# Patient Record
Sex: FEMALE | Race: WHITE | NOT HISPANIC OR LATINO | Employment: OTHER | URBAN - METROPOLITAN AREA
[De-identification: names, ages, dates, MRNs, and addresses within clinical notes are randomized per-mention and may not be internally consistent; named-entity substitution may affect disease eponyms.]

---

## 2023-03-03 RX ORDER — METOPROLOL SUCCINATE 50 MG/1
50 TABLET, EXTENDED RELEASE ORAL DAILY
COMMUNITY
Start: 2022-08-19 | End: 2023-05-01 | Stop reason: SDUPTHER

## 2023-03-03 RX ORDER — MONTELUKAST SODIUM 10 MG/1
10 TABLET ORAL NIGHTLY
COMMUNITY
Start: 2022-08-19 | End: 2023-05-01 | Stop reason: SDUPTHER

## 2023-03-03 RX ORDER — OMEPRAZOLE 20 MG/1
20 TABLET, DELAYED RELEASE ORAL DAILY
COMMUNITY
Start: 2022-08-19 | End: 2023-04-11 | Stop reason: SDUPTHER

## 2023-03-03 RX ORDER — TRAMADOL HYDROCHLORIDE 50 MG/1
50 TABLET ORAL EVERY 6 HOURS PRN
COMMUNITY
Start: 2022-09-07 | End: 2024-01-16 | Stop reason: ALTCHOICE

## 2023-03-03 RX ORDER — BUSPIRONE HYDROCHLORIDE 15 MG/1
1 TABLET ORAL 2 TIMES DAILY
COMMUNITY
Start: 2022-08-19 | End: 2023-10-10 | Stop reason: SDUPTHER

## 2023-03-03 RX ORDER — POTASSIUM CHLORIDE 1500 MG/1
20 TABLET, EXTENDED RELEASE ORAL DAILY
COMMUNITY
Start: 2022-08-19 | End: 2024-01-16 | Stop reason: ALTCHOICE

## 2023-03-03 RX ORDER — MULTIVITAMIN
1 TABLET ORAL DAILY
COMMUNITY
Start: 2022-08-19 | End: 2024-01-16 | Stop reason: ALTCHOICE

## 2023-03-03 RX ORDER — ALBUTEROL SULFATE 90 UG/1
2 AEROSOL, METERED RESPIRATORY (INHALATION) EVERY 6 HOURS PRN
COMMUNITY
Start: 2022-08-19 | End: 2024-02-13 | Stop reason: SDUPTHER

## 2023-03-03 RX ORDER — LOSARTAN POTASSIUM AND HYDROCHLOROTHIAZIDE 25; 100 MG/1; MG/1
1 TABLET ORAL DAILY
COMMUNITY
Start: 2022-08-19 | End: 2023-04-11 | Stop reason: SDUPTHER

## 2023-03-03 RX ORDER — POLYETHYLENE GLYCOL 3350 17 G/17G
17 POWDER, FOR SOLUTION ORAL
COMMUNITY
Start: 2022-08-19 | End: 2024-01-16 | Stop reason: ALTCHOICE

## 2023-03-03 RX ORDER — TRAZODONE HYDROCHLORIDE 50 MG/1
50 TABLET ORAL NIGHTLY
COMMUNITY
Start: 2022-08-19 | End: 2023-10-10 | Stop reason: SDUPTHER

## 2023-03-03 RX ORDER — DOCUSATE SODIUM 100 MG/1
100 CAPSULE, LIQUID FILLED ORAL 2 TIMES DAILY
COMMUNITY
Start: 2022-08-19 | End: 2024-02-16

## 2023-03-03 RX ORDER — GABAPENTIN 300 MG/1
300 CAPSULE ORAL 3 TIMES DAILY
COMMUNITY
End: 2023-10-10

## 2023-03-03 RX ORDER — FERROUS SULFATE 325(65) MG
65 TABLET ORAL
COMMUNITY
Start: 2022-09-08 | End: 2024-01-16 | Stop reason: ALTCHOICE

## 2023-03-03 RX ORDER — HYDROXYZINE HYDROCHLORIDE 25 MG/1
25 TABLET, FILM COATED ORAL 2 TIMES DAILY
COMMUNITY
Start: 2022-08-19 | End: 2023-10-10

## 2023-03-03 RX ORDER — LANOLIN ALCOHOL/MO/W.PET/CERES
1 CREAM (GRAM) TOPICAL DAILY
COMMUNITY
Start: 2022-08-19

## 2023-03-03 RX ORDER — MV/FA/DHA/EPA/FISH OIL/SAW/GNK 400MCG-200
COMBINATION PACKAGE (EA) ORAL
COMMUNITY
Start: 2022-08-19

## 2023-03-03 RX ORDER — ESCITALOPRAM OXALATE 10 MG/1
10 TABLET ORAL DAILY
COMMUNITY
Start: 2022-08-19 | End: 2023-12-08

## 2023-03-03 RX ORDER — ATORVASTATIN CALCIUM 10 MG/1
10 TABLET, FILM COATED ORAL DAILY
COMMUNITY
Start: 2022-08-19 | End: 2023-05-01 | Stop reason: SDUPTHER

## 2023-03-03 RX ORDER — VIT C/E/ZN/COPPR/LUTEIN/ZEAXAN 250MG-90MG
25 CAPSULE ORAL DAILY
COMMUNITY
Start: 2022-08-19

## 2023-03-03 RX ORDER — FLUTICASONE PROPIONATE 110 UG/1
2 AEROSOL, METERED RESPIRATORY (INHALATION) 2 TIMES DAILY
COMMUNITY
Start: 2022-08-19 | End: 2024-01-04

## 2023-03-24 ENCOUNTER — TELEPHONE (OUTPATIENT)
Dept: PRIMARY CARE | Facility: CLINIC | Age: 78
End: 2023-03-24
Payer: MEDICARE

## 2023-03-24 DIAGNOSIS — F43.0 ACUTE REACTION TO STRESS: ICD-10-CM

## 2023-03-24 RX ORDER — BUPROPION HYDROCHLORIDE 150 MG/1
150 TABLET ORAL DAILY
Qty: 90 TABLET | Refills: 1 | Status: CANCELLED | OUTPATIENT
Start: 2023-03-24

## 2023-03-24 NOTE — TELEPHONE ENCOUNTER
Pt said it is cheaper for her to get her 90 day supplies and wanted to know if you can send her refill to [facility].     REFILL  MEDICATION:     Bupropion HCL ER  MG; Take 1 tablet daily.     PHARM: [facility]    LR: 9-27-22  90 tablets with 1 refill   LV: 1-14-23  NV: 4-11-23

## 2023-03-25 RX ORDER — BUPROPION HYDROCHLORIDE 150 MG/1
150 TABLET ORAL DAILY
Qty: 90 TABLET | Refills: 1 | Status: SHIPPED | OUTPATIENT
Start: 2023-03-25 | End: 2023-09-22 | Stop reason: SDUPTHER

## 2023-04-07 ENCOUNTER — TELEPHONE (OUTPATIENT)
Dept: PRIMARY CARE | Facility: CLINIC | Age: 78
End: 2023-04-07
Payer: MEDICARE

## 2023-04-07 DIAGNOSIS — K21.9 GASTROESOPHAGEAL REFLUX DISEASE WITHOUT ESOPHAGITIS: Primary | ICD-10-CM

## 2023-04-07 DIAGNOSIS — I10 BENIGN ESSENTIAL HYPERTENSION: ICD-10-CM

## 2023-04-07 NOTE — TELEPHONE ENCOUNTER
Pt will be out of her Losartan before she will receive her prescriptions. I sent a prescription refill to a covering for a 30 day supply.     REFILL  MEDICATION:     Omeprazole 20 MG; Take 1 capsule PO every day.    Losartan Potassium hydrochlorothiazide 100-25 MG; Take 1 tablet daily.     PHARM: [facility]    LR: 2-21-23  30 tablets with 0 refills   LV: 1-17-23  NV: 4-11-23

## 2023-04-07 NOTE — TELEPHONE ENCOUNTER
Pt of Dr. Ruba Maher. Dr. Cuellar is covering. Pt will be out of her Losartan before Tuesday and before she receives her prescription from [facility]. Can you send in a 30 day supply to [facility]?     REFILL  MEDICATION:     Losartan Potassium hydrochlorothiazide 110-25 MG; Take 1 tablet daily.     PHARM: [facility]   PHARM NUMBER: [phone]    LR: 2-21-23  30 tablets with 0 refills   LV: 1-17-23  NV: 4-11-23

## 2023-04-11 ENCOUNTER — OFFICE VISIT (OUTPATIENT)
Dept: PRIMARY CARE | Facility: CLINIC | Age: 78
End: 2023-04-11
Payer: MEDICARE

## 2023-04-11 VITALS
SYSTOLIC BLOOD PRESSURE: 138 MMHG | HEIGHT: 68 IN | BODY MASS INDEX: 28.04 KG/M2 | DIASTOLIC BLOOD PRESSURE: 70 MMHG | WEIGHT: 185 LBS | TEMPERATURE: 97.5 F

## 2023-04-11 DIAGNOSIS — Z78.0 ASYMPTOMATIC MENOPAUSAL STATE: Primary | ICD-10-CM

## 2023-04-11 DIAGNOSIS — I10 BENIGN ESSENTIAL HYPERTENSION: ICD-10-CM

## 2023-04-11 DIAGNOSIS — Z00.00 ROUTINE GENERAL MEDICAL EXAMINATION AT HEALTH CARE FACILITY: ICD-10-CM

## 2023-04-11 PROBLEM — Z96.651 HISTORY OF TOTAL RIGHT KNEE REPLACEMENT: Status: ACTIVE | Noted: 2023-04-11

## 2023-04-11 PROBLEM — J45.909 ASTHMA (HHS-HCC): Status: ACTIVE | Noted: 2023-04-11

## 2023-04-11 PROBLEM — Z96.651 PAINFUL TOTAL KNEE REPLACEMENT, RIGHT (CMS-HCC): Status: ACTIVE | Noted: 2023-04-11

## 2023-04-11 PROBLEM — E55.9 VITAMIN D DEFICIENCY: Status: ACTIVE | Noted: 2023-04-11

## 2023-04-11 PROBLEM — Z96.652 STATUS POST LEFT KNEE REPLACEMENT: Status: ACTIVE | Noted: 2023-04-11

## 2023-04-11 PROBLEM — Z96.1 PSEUDOPHAKIA, RIGHT EYE: Status: ACTIVE | Noted: 2023-04-11

## 2023-04-11 PROBLEM — M43.16 SPONDYLOLISTHESIS AT L4-L5 LEVEL: Status: ACTIVE | Noted: 2023-04-11

## 2023-04-11 PROBLEM — K63.5 COLONIC POLYP: Status: ACTIVE | Noted: 2023-04-11

## 2023-04-11 PROBLEM — M47.816 DEGENERATIVE JOINT DISEASE (DJD) OF LUMBAR SPINE: Status: ACTIVE | Noted: 2023-04-11

## 2023-04-11 PROBLEM — D50.9 IRON DEFICIENCY ANEMIA: Status: ACTIVE | Noted: 2023-04-11

## 2023-04-11 PROBLEM — F32.9 MDD (MAJOR DEPRESSIVE DISORDER): Status: ACTIVE | Noted: 2023-04-11

## 2023-04-11 PROBLEM — K21.9 GERD (GASTROESOPHAGEAL REFLUX DISEASE): Status: ACTIVE | Noted: 2023-04-11

## 2023-04-11 PROBLEM — I49.3 PVCS (PREMATURE VENTRICULAR CONTRACTIONS): Status: ACTIVE | Noted: 2023-04-11

## 2023-04-11 PROBLEM — F33.9 MAJOR DEPRESSIVE DISORDER, RECURRENT (CMS-HCC): Status: ACTIVE | Noted: 2023-04-11

## 2023-04-11 PROBLEM — G60.8 POLYNEUROPATHY, PERIPHERAL SENSORIMOTOR AXONAL: Status: ACTIVE | Noted: 2023-04-11

## 2023-04-11 PROBLEM — F41.9 ANXIETY DISORDER: Status: ACTIVE | Noted: 2023-04-11

## 2023-04-11 PROBLEM — M25.50 POLYARTHRALGIA: Status: ACTIVE | Noted: 2023-04-11

## 2023-04-11 PROBLEM — G47.00 INSOMNIA: Status: ACTIVE | Noted: 2023-04-11

## 2023-04-11 PROBLEM — M17.0 PRIMARY OSTEOARTHRITIS OF BOTH KNEES: Status: ACTIVE | Noted: 2023-04-11

## 2023-04-11 PROBLEM — R00.2 PALPITATIONS: Status: ACTIVE | Noted: 2023-04-11

## 2023-04-11 PROBLEM — M81.0 OSTEOPOROSIS, SENILE: Status: ACTIVE | Noted: 2023-04-11

## 2023-04-11 PROBLEM — M19.011 DEGENERATIVE JOINT DISEASE, SHOULDER, RIGHT: Status: ACTIVE | Noted: 2023-04-11

## 2023-04-11 PROBLEM — G89.18 PAIN FOLLOWING ORAL SURGERY: Status: ACTIVE | Noted: 2023-04-11

## 2023-04-11 PROBLEM — Z96.1 PSEUDOPHAKIA OF LEFT EYE: Status: ACTIVE | Noted: 2023-04-11

## 2023-04-11 PROBLEM — R53.83 FATIGUE: Status: ACTIVE | Noted: 2023-04-11

## 2023-04-11 PROBLEM — M96.1 LUMBAR POSTLAMINECTOMY SYNDROME: Status: ACTIVE | Noted: 2023-04-11

## 2023-04-11 PROBLEM — E78.5 HYPERLIPIDEMIA: Status: ACTIVE | Noted: 2023-04-11

## 2023-04-11 PROBLEM — M19.90 OA (OSTEOARTHRITIS): Status: ACTIVE | Noted: 2023-04-11

## 2023-04-11 PROBLEM — M54.9 BACK PAIN: Status: ACTIVE | Noted: 2023-04-11

## 2023-04-11 PROBLEM — M16.9 DEGENERATIVE JOINT DISEASE (DJD) OF HIP: Status: ACTIVE | Noted: 2023-04-11

## 2023-04-11 PROBLEM — T84.84XA PAINFUL TOTAL KNEE REPLACEMENT, RIGHT (CMS-HCC): Status: ACTIVE | Noted: 2023-04-11

## 2023-04-11 PROCEDURE — 1159F MED LIST DOCD IN RCRD: CPT | Performed by: FAMILY MEDICINE

## 2023-04-11 PROCEDURE — 1170F FXNL STATUS ASSESSED: CPT | Performed by: FAMILY MEDICINE

## 2023-04-11 PROCEDURE — G0439 PPPS, SUBSEQ VISIT: HCPCS | Performed by: FAMILY MEDICINE

## 2023-04-11 PROCEDURE — 3075F SYST BP GE 130 - 139MM HG: CPT | Performed by: FAMILY MEDICINE

## 2023-04-11 PROCEDURE — 3078F DIAST BP <80 MM HG: CPT | Performed by: FAMILY MEDICINE

## 2023-04-11 PROCEDURE — 1036F TOBACCO NON-USER: CPT | Performed by: FAMILY MEDICINE

## 2023-04-11 RX ORDER — OMEPRAZOLE 20 MG/1
20 TABLET, DELAYED RELEASE ORAL DAILY
Qty: 30 TABLET | Refills: 0 | Status: SHIPPED | OUTPATIENT
Start: 2023-04-11 | End: 2023-05-01 | Stop reason: SDUPTHER

## 2023-04-11 RX ORDER — LOSARTAN POTASSIUM AND HYDROCHLOROTHIAZIDE 25; 100 MG/1; MG/1
1 TABLET ORAL DAILY
Qty: 90 TABLET | Refills: 3 | Status: SHIPPED | OUTPATIENT
Start: 2023-04-11 | End: 2023-04-11 | Stop reason: SDUPTHER

## 2023-04-11 RX ORDER — OMEPRAZOLE 20 MG/1
20 TABLET, DELAYED RELEASE ORAL DAILY
Qty: 30 TABLET | Refills: 0 | OUTPATIENT
Start: 2023-04-11

## 2023-04-11 RX ORDER — LOSARTAN POTASSIUM AND HYDROCHLOROTHIAZIDE 25; 100 MG/1; MG/1
1 TABLET ORAL DAILY
Qty: 30 TABLET | Refills: 0 | Status: SHIPPED | OUTPATIENT
Start: 2023-04-11 | End: 2023-04-11 | Stop reason: SDUPTHER

## 2023-04-11 RX ORDER — OMEPRAZOLE 20 MG/1
20 TABLET, DELAYED RELEASE ORAL DAILY
Qty: 90 TABLET | Refills: 3 | Status: SHIPPED | OUTPATIENT
Start: 2023-04-11 | End: 2023-04-11 | Stop reason: SDUPTHER

## 2023-04-11 RX ORDER — LOSARTAN POTASSIUM AND HYDROCHLOROTHIAZIDE 25; 100 MG/1; MG/1
1 TABLET ORAL DAILY
Qty: 90 TABLET | Refills: 3 | Status: SHIPPED | OUTPATIENT
Start: 2023-04-11 | End: 2023-10-10 | Stop reason: SDUPTHER

## 2023-04-11 RX ORDER — LOSARTAN POTASSIUM AND HYDROCHLOROTHIAZIDE 25; 100 MG/1; MG/1
1 TABLET ORAL DAILY
Qty: 30 TABLET | Refills: 0 | OUTPATIENT
Start: 2023-04-11

## 2023-04-11 ASSESSMENT — PATIENT HEALTH QUESTIONNAIRE - PHQ9
SUM OF ALL RESPONSES TO PHQ9 QUESTIONS 1 AND 2: 0
2. FEELING DOWN, DEPRESSED OR HOPELESS: NOT AT ALL
1. LITTLE INTEREST OR PLEASURE IN DOING THINGS: NOT AT ALL

## 2023-04-11 NOTE — PROGRESS NOTES
"Subjective   Reason for Visit: Carmen Bauman is an 77 y.o. female here for a Medicare Wellness visit.     Past Medical, Surgical, and Family History reviewed and updated in chart.         HPI    Patient Care Team:  Ruba Maher DO as PCP - General  Ruba Maher DO as PCP - [facility] [place]     Review of Systems    Objective   Vitals:  /70 (BP Location: Left arm, Patient Position: Sitting)   Temp 36.4 °C (97.5 °F)   Ht 1.727 m (5' 8\")   Wt 83.9 kg (185 lb)   BMI 28.13 kg/m²       Physical Exam    Assessment/Plan   Problem List Items Addressed This Visit          Circulatory    Benign essential hypertension     Other Visit Diagnoses       Asymptomatic menopausal state    -  Primary    Relevant Orders    XR DEXA bone density    Routine general medical examination at health care facility              Medications reviewed, Dexa ordered for bone density testing  Follow up 3-6 mo     "

## 2023-04-12 ASSESSMENT — ACTIVITIES OF DAILY LIVING (ADL)
DOING_HOUSEWORK: INDEPENDENT
DRESSING: INDEPENDENT
GROCERY_SHOPPING: INDEPENDENT
MANAGING_FINANCES: INDEPENDENT
BATHING: INDEPENDENT
TAKING_MEDICATION: INDEPENDENT

## 2023-04-12 ASSESSMENT — PATIENT HEALTH QUESTIONNAIRE - PHQ9
2. FEELING DOWN, DEPRESSED OR HOPELESS: NOT AT ALL
1. LITTLE INTEREST OR PLEASURE IN DOING THINGS: NOT AT ALL
SUM OF ALL RESPONSES TO PHQ9 QUESTIONS 1 AND 2: 0

## 2023-05-01 ENCOUNTER — TELEPHONE (OUTPATIENT)
Dept: PRIMARY CARE | Facility: CLINIC | Age: 78
End: 2023-05-01
Payer: MEDICARE

## 2023-05-01 DIAGNOSIS — E78.5 HYPERLIPIDEMIA, UNSPECIFIED HYPERLIPIDEMIA TYPE: Primary | ICD-10-CM

## 2023-05-01 DIAGNOSIS — I10 BENIGN ESSENTIAL HYPERTENSION: ICD-10-CM

## 2023-05-01 DIAGNOSIS — J45.909 ASTHMA, UNSPECIFIED ASTHMA SEVERITY, UNSPECIFIED WHETHER COMPLICATED, UNSPECIFIED WHETHER PERSISTENT (HHS-HCC): ICD-10-CM

## 2023-05-01 DIAGNOSIS — K21.9 GASTROESOPHAGEAL REFLUX DISEASE WITHOUT ESOPHAGITIS: ICD-10-CM

## 2023-05-01 RX ORDER — MONTELUKAST SODIUM 10 MG/1
10 TABLET ORAL NIGHTLY
Qty: 90 TABLET | Refills: 3 | Status: SHIPPED | OUTPATIENT
Start: 2023-05-01 | End: 2024-04-10 | Stop reason: SDUPTHER

## 2023-05-01 RX ORDER — OMEPRAZOLE 20 MG/1
20 TABLET, DELAYED RELEASE ORAL DAILY
Qty: 90 TABLET | Refills: 1 | Status: SHIPPED | OUTPATIENT
Start: 2023-05-01 | End: 2023-10-10 | Stop reason: SDUPTHER

## 2023-05-01 RX ORDER — ATORVASTATIN CALCIUM 10 MG/1
10 TABLET, FILM COATED ORAL DAILY
Qty: 90 TABLET | Refills: 3 | Status: SHIPPED | OUTPATIENT
Start: 2023-05-01 | End: 2023-10-10 | Stop reason: SDUPTHER

## 2023-05-01 RX ORDER — METOPROLOL SUCCINATE 50 MG/1
50 TABLET, EXTENDED RELEASE ORAL DAILY
Qty: 90 TABLET | Refills: 3 | Status: SHIPPED | OUTPATIENT
Start: 2023-05-01 | End: 2024-04-10 | Stop reason: SDUPTHER

## 2023-05-01 NOTE — TELEPHONE ENCOUNTER
REFILL  MEDICATION:     Atorvastatin Calcium 20 MG; Take one tablet po daily.     Metoprolol Succinate ER 50 MG; 1 tab every day at bed time.     Montelukast Sodium 10 MG; Take 1 tablet at bedtime.     Omeprazole 20 MG; Take 1 capsule po every day.     LR: 2-21-23  30 capsules with 0 refills     PHARM: [facility]     LR: 4-28-22  90 tablets with 3 refills for first three medications   LV: 4-11-23  NV: 10-10-23

## 2023-06-06 ENCOUNTER — APPOINTMENT (OUTPATIENT)
Dept: PRIMARY CARE | Facility: CLINIC | Age: 78
End: 2023-06-06
Payer: MEDICARE

## 2023-06-18 LAB — URINE CULTURE: NORMAL

## 2023-06-19 PROBLEM — M19.90 UNSPECIFIED OSTEOARTHRITIS, UNSPECIFIED SITE: Status: ACTIVE | Noted: 2022-08-11

## 2023-06-19 PROBLEM — E55.9 VITAMIN D DEFICIENCY, UNSPECIFIED: Status: ACTIVE | Noted: 2022-08-11

## 2023-06-19 PROBLEM — E78.5 HYPERLIPIDEMIA, UNSPECIFIED: Status: ACTIVE | Noted: 2022-08-11

## 2023-06-19 PROBLEM — I10 ESSENTIAL (PRIMARY) HYPERTENSION: Status: ACTIVE | Noted: 2022-08-11

## 2023-06-19 PROBLEM — F41.9 ANXIETY DISORDER, UNSPECIFIED: Status: ACTIVE | Noted: 2022-08-11

## 2023-06-19 PROBLEM — F32.A DEPRESSION, UNSPECIFIED: Status: ACTIVE | Noted: 2022-08-11

## 2023-06-19 PROBLEM — M47.16 LUMBAR SPONDYLOSIS WITH MYELOPATHY: Status: ACTIVE | Noted: 2019-09-21

## 2023-06-19 PROBLEM — R52 PAIN, UNSPECIFIED: Status: ACTIVE | Noted: 2022-08-11

## 2023-06-19 PROBLEM — R27.9 UNSPECIFIED LACK OF COORDINATION: Status: ACTIVE | Noted: 2022-08-11

## 2023-06-19 PROBLEM — I47.10 SUPRAVENTRICULAR TACHYCARDIA (CMS-HCC): Status: ACTIVE | Noted: 2019-09-24

## 2023-06-19 PROBLEM — K21.9 GASTRO-ESOPHAGEAL REFLUX DISEASE WITHOUT ESOPHAGITIS: Status: ACTIVE | Noted: 2022-08-11

## 2023-06-19 PROBLEM — M62.81 MUSCLE WEAKNESS (GENERALIZED): Status: ACTIVE | Noted: 2022-08-11

## 2023-06-19 PROBLEM — Z96.642 PRESENCE OF LEFT ARTIFICIAL HIP JOINT: Status: ACTIVE | Noted: 2019-02-21

## 2023-06-19 PROBLEM — J45.909 UNSPECIFIED ASTHMA, UNCOMPLICATED (HHS-HCC): Status: ACTIVE | Noted: 2022-08-11

## 2023-06-20 ENCOUNTER — APPOINTMENT (OUTPATIENT)
Dept: PRIMARY CARE | Facility: CLINIC | Age: 78
End: 2023-06-20
Payer: MEDICARE

## 2023-09-11 ENCOUNTER — HOSPITAL ENCOUNTER (OUTPATIENT)
Dept: DATA CONVERSION | Facility: HOSPITAL | Age: 78
End: 2023-09-11
Attending: ANESTHESIOLOGY | Admitting: ANESTHESIOLOGY
Payer: MEDICARE

## 2023-09-11 DIAGNOSIS — M54.16 RADICULOPATHY, LUMBAR REGION: ICD-10-CM

## 2023-09-15 LAB
6-ACETYLMORPHINE: <25 NG/ML
7-AMINOCLONAZEPAM: <25 NG/ML
ALPHA-HYDROXYALPRAZOLAM: <25 NG/ML
ALPHA-HYDROXYMIDAZOLAM: <25 NG/ML
ALPRAZOLAM: <25 NG/ML
AMPHETAMINE (PRESENCE) IN URINE BY SCREEN METHOD: ABNORMAL
BARBITURATES PRESENCE IN URINE BY SCREEN METHOD: ABNORMAL
CANNABINOIDS IN URINE BY SCREEN METHOD: ABNORMAL
CHLORDIAZEPOXIDE: <25 NG/ML
CLONAZEPAM: <25 NG/ML
COCAINE (PRESENCE) IN URINE BY SCREEN METHOD: ABNORMAL
CODEINE: <50 NG/ML
CREATINE, URINE FOR DRUG: 45.3 MG/DL
DIAZEPAM: <25 NG/ML
DRUG SCREEN COMMENT URINE: ABNORMAL
EDDP: <25 NG/ML
FENTANYL CONFIRMATION, URINE: <2.5 NG/ML
HYDROCODONE: <25 NG/ML
HYDROMORPHONE: <25 NG/ML
LORAZEPAM: <25 NG/ML
METHADONE CONFIRMATION,URINE: <25 NG/ML
MIDAZOLAM: <25 NG/ML
MORPHINE URINE: <50 NG/ML
NORDIAZEPAM: <25 NG/ML
NORFENTANYL: <2.5 NG/ML
NORHYDROCODONE: <25 NG/ML
NOROXYCODONE: <25 NG/ML
O-DESMETHYLTRAMADOL: >1000 NG/ML
OXAZEPAM: <25 NG/ML
OXYCODONE: <25 NG/ML
OXYMORPHONE: <25 NG/ML
PHENCYCLIDINE (PRESENCE) IN URINE BY SCREEN METHOD: ABNORMAL
TEMAZEPAM: <25 NG/ML
TRAMADOL: >1000 NG/ML
ZOLPIDEM METABOLITE (ZCA): <25 NG/ML
ZOLPIDEM: <25 NG/ML

## 2023-09-22 ENCOUNTER — TELEPHONE (OUTPATIENT)
Dept: PRIMARY CARE | Facility: CLINIC | Age: 78
End: 2023-09-22
Payer: MEDICARE

## 2023-09-22 DIAGNOSIS — F43.0 ACUTE REACTION TO STRESS: ICD-10-CM

## 2023-09-22 RX ORDER — BUPROPION HYDROCHLORIDE 150 MG/1
150 TABLET ORAL DAILY
Qty: 90 TABLET | Refills: 1 | Status: SHIPPED | OUTPATIENT
Start: 2023-09-22 | End: 2024-04-10 | Stop reason: SDUPTHER

## 2023-09-22 NOTE — TELEPHONE ENCOUNTER
REFILL  MEDICATION:     Bupropion  MG;Take 1 tablet once daily. Do not crush, chew, or split.     PHARM: [facility]     LR: 3-25-23   90 tablets with 1 refill  LV: 4-11-23  NV: 10-10-23

## 2023-09-29 VITALS — WEIGHT: 183.42 LBS | BODY MASS INDEX: 27.8 KG/M2 | HEIGHT: 68 IN

## 2023-10-09 PROBLEM — L82.1 OTHER SEBORRHEIC KERATOSIS: Status: RESOLVED | Noted: 2021-05-27 | Resolved: 2023-10-09

## 2023-10-09 PROBLEM — R06.7 SNEEZING WITH WATERY EYES: Status: ACTIVE | Noted: 2023-10-09

## 2023-10-09 PROBLEM — L81.4 OTHER MELANIN HYPERPIGMENTATION: Status: RESOLVED | Noted: 2021-05-27 | Resolved: 2023-10-09

## 2023-10-09 PROBLEM — D49.2 NEOPLASM OF UNSPECIFIED BEHAVIOR OF BONE, SOFT TISSUE, AND SKIN: Status: ACTIVE | Noted: 2021-05-27

## 2023-10-09 PROBLEM — R21 RASH AND OTHER NONSPECIFIC SKIN ERUPTION: Status: RESOLVED | Noted: 2021-05-27 | Resolved: 2023-10-09

## 2023-10-09 PROBLEM — D22.5 MELANOCYTIC NEVI OF TRUNK: Status: RESOLVED | Noted: 2021-05-27 | Resolved: 2023-10-09

## 2023-10-09 PROBLEM — J30.9 ALLERGIC RHINITIS: Status: ACTIVE | Noted: 2023-10-09

## 2023-10-09 PROBLEM — H04.209 SNEEZING WITH WATERY EYES: Status: ACTIVE | Noted: 2023-10-09

## 2023-10-09 PROBLEM — D18.01 HEMANGIOMA OF SKIN AND SUBCUTANEOUS TISSUE: Status: RESOLVED | Noted: 2021-05-27 | Resolved: 2023-10-09

## 2023-10-10 ENCOUNTER — OFFICE VISIT (OUTPATIENT)
Dept: PRIMARY CARE | Facility: CLINIC | Age: 78
End: 2023-10-10
Payer: MEDICARE

## 2023-10-10 VITALS — WEIGHT: 188 LBS | DIASTOLIC BLOOD PRESSURE: 68 MMHG | BODY MASS INDEX: 28.66 KG/M2 | SYSTOLIC BLOOD PRESSURE: 118 MMHG

## 2023-10-10 DIAGNOSIS — E78.5 HYPERLIPIDEMIA, UNSPECIFIED HYPERLIPIDEMIA TYPE: ICD-10-CM

## 2023-10-10 DIAGNOSIS — K21.9 GASTROESOPHAGEAL REFLUX DISEASE WITHOUT ESOPHAGITIS: ICD-10-CM

## 2023-10-10 DIAGNOSIS — F51.01 PRIMARY INSOMNIA: Primary | ICD-10-CM

## 2023-10-10 DIAGNOSIS — M54.2 NECK PAIN: ICD-10-CM

## 2023-10-10 DIAGNOSIS — I10 BENIGN ESSENTIAL HYPERTENSION: ICD-10-CM

## 2023-10-10 DIAGNOSIS — F41.9 ANXIETY DISORDER, UNSPECIFIED TYPE: ICD-10-CM

## 2023-10-10 PROCEDURE — 3074F SYST BP LT 130 MM HG: CPT | Performed by: FAMILY MEDICINE

## 2023-10-10 PROCEDURE — 3078F DIAST BP <80 MM HG: CPT | Performed by: FAMILY MEDICINE

## 2023-10-10 PROCEDURE — 1036F TOBACCO NON-USER: CPT | Performed by: FAMILY MEDICINE

## 2023-10-10 PROCEDURE — 99214 OFFICE O/P EST MOD 30 MIN: CPT | Performed by: FAMILY MEDICINE

## 2023-10-10 PROCEDURE — 1159F MED LIST DOCD IN RCRD: CPT | Performed by: FAMILY MEDICINE

## 2023-10-10 PROCEDURE — 1125F AMNT PAIN NOTED PAIN PRSNT: CPT | Performed by: FAMILY MEDICINE

## 2023-10-10 RX ORDER — BUSPIRONE HYDROCHLORIDE 15 MG/1
15 TABLET ORAL 2 TIMES DAILY PRN
Qty: 90 TABLET | Refills: 0
Start: 2023-10-10

## 2023-10-10 RX ORDER — MULTIVITAMIN/IRON/FOLIC ACID 18MG-0.4MG
1 TABLET ORAL DAILY
COMMUNITY

## 2023-10-10 RX ORDER — LOSARTAN POTASSIUM AND HYDROCHLOROTHIAZIDE 25; 100 MG/1; MG/1
1 TABLET ORAL DAILY
Qty: 90 TABLET | Refills: 3 | Status: SHIPPED | OUTPATIENT
Start: 2023-10-10 | End: 2024-05-15 | Stop reason: DRUGHIGH

## 2023-10-10 RX ORDER — OMEPRAZOLE 20 MG/1
20 TABLET, DELAYED RELEASE ORAL DAILY
Qty: 90 TABLET | Refills: 1 | Status: SHIPPED | OUTPATIENT
Start: 2023-10-10 | End: 2024-04-10 | Stop reason: SDUPTHER

## 2023-10-10 RX ORDER — TRAZODONE HYDROCHLORIDE 50 MG/1
25 TABLET ORAL NIGHTLY
Qty: 45 TABLET | Refills: 1
Start: 2023-10-10 | End: 2023-11-13 | Stop reason: SDUPTHER

## 2023-10-10 RX ORDER — ATORVASTATIN CALCIUM 10 MG/1
10 TABLET, FILM COATED ORAL DAILY
Qty: 90 TABLET | Refills: 3 | Status: SHIPPED | OUTPATIENT
Start: 2023-10-10 | End: 2024-04-10 | Stop reason: SDUPTHER

## 2023-10-10 NOTE — PROGRESS NOTES
Chief complaint:   Chief Complaint   Patient presents with    Follow-up     6 month follow up    Med Refill     Omeprazole       HPI:  Carmen Bauman is a 77 y.o. female who presents for follow up. She has had neck pain and upper back pain.     Living in independent living    Physical exam:  /68   Wt 85.3 kg (188 lb)   BMI 28.66 kg/m²   General: NAD, well appearing female  Heart: RRR, no mumur appreciated  Lungs: CTAB, no wheezes, rales, rhonchi  Abdomen: soft, non tender, normoactive BS, no organomegaly  MSK: no bony tenderness    Assessment/Plan   Problem List Items Addressed This Visit       Anxiety disorder, unspecified    Relevant Medications    busPIRone (Buspar) 15 mg tablet    Hyperlipidemia, unspecified    Relevant Medications    atorvastatin (Lipitor) 10 mg tablet    Insomnia - Primary    Relevant Medications    traZODone (Desyrel) 50 mg tablet     Other Visit Diagnoses       Gastroesophageal reflux disease without esophagitis        Relevant Medications    omeprazole OTC (PriLOSEC OTC) 20 mg EC tablet    Benign essential hypertension        Relevant Medications    losartan-hydrochlorothiazide (Hyzaar) 100-25 mg tablet    Neck pain        Relevant Orders    XR thoracic spine 3 views (Completed)        Medications refilled as above  Follow up 6 mo    Ruba Maher DO

## 2023-10-11 ENCOUNTER — TELEPHONE (OUTPATIENT)
Dept: PRIMARY CARE | Facility: CLINIC | Age: 78
End: 2023-10-11
Payer: MEDICARE

## 2023-10-11 ENCOUNTER — ANCILLARY PROCEDURE (OUTPATIENT)
Dept: RADIOLOGY | Facility: CLINIC | Age: 78
End: 2023-10-11
Payer: MEDICARE

## 2023-10-11 DIAGNOSIS — M54.2 NECK PAIN: ICD-10-CM

## 2023-10-11 PROCEDURE — 72050 X-RAY EXAM NECK SPINE 4/5VWS: CPT | Performed by: RADIOLOGY

## 2023-10-11 PROCEDURE — 72050 X-RAY EXAM NECK SPINE 4/5VWS: CPT | Mod: FY

## 2023-10-11 PROCEDURE — 72072 X-RAY EXAM THORAC SPINE 3VWS: CPT | Performed by: RADIOLOGY

## 2023-10-11 PROCEDURE — 72072 X-RAY EXAM THORAC SPINE 3VWS: CPT | Mod: FY

## 2023-10-11 NOTE — TELEPHONE ENCOUNTER
Janene from [facility] [place] calling and wanted to let you know that the pt did receive her Flu shot at the facility on 10-9-23. If you have any questions she said that she can be reached at [phone].

## 2023-10-13 ENCOUNTER — APPOINTMENT (OUTPATIENT)
Dept: INTEGRATIVE MEDICINE | Facility: CLINIC | Age: 78
End: 2023-10-13
Payer: MEDICARE

## 2023-10-13 ENCOUNTER — ANCILLARY PROCEDURE (OUTPATIENT)
Dept: RADIOLOGY | Facility: CLINIC | Age: 78
End: 2023-10-13
Payer: MEDICARE

## 2023-10-13 DIAGNOSIS — Z78.0 ASYMPTOMATIC MENOPAUSAL STATE: ICD-10-CM

## 2023-10-13 PROCEDURE — 77080 DXA BONE DENSITY AXIAL: CPT | Performed by: STUDENT IN AN ORGANIZED HEALTH CARE EDUCATION/TRAINING PROGRAM

## 2023-10-13 PROCEDURE — 77080 DXA BONE DENSITY AXIAL: CPT

## 2023-10-26 ENCOUNTER — OFFICE VISIT (OUTPATIENT)
Dept: PAIN MEDICINE | Facility: CLINIC | Age: 78
End: 2023-10-26
Payer: MEDICARE

## 2023-10-26 VITALS
SYSTOLIC BLOOD PRESSURE: 117 MMHG | HEART RATE: 66 BPM | TEMPERATURE: 97.7 F | DIASTOLIC BLOOD PRESSURE: 65 MMHG | BODY MASS INDEX: 28.49 KG/M2 | HEIGHT: 68 IN | RESPIRATION RATE: 16 BRPM | WEIGHT: 188 LBS | OXYGEN SATURATION: 92 %

## 2023-10-26 DIAGNOSIS — M43.16 SPONDYLOLISTHESIS AT L4-L5 LEVEL: ICD-10-CM

## 2023-10-26 DIAGNOSIS — M47.22 CERVICAL SPONDYLOSIS WITH RADICULOPATHY: Primary | ICD-10-CM

## 2023-10-26 PROCEDURE — 1159F MED LIST DOCD IN RCRD: CPT | Performed by: ANESTHESIOLOGY

## 2023-10-26 PROCEDURE — 99214 OFFICE O/P EST MOD 30 MIN: CPT | Performed by: ANESTHESIOLOGY

## 2023-10-26 PROCEDURE — 1125F AMNT PAIN NOTED PAIN PRSNT: CPT | Performed by: ANESTHESIOLOGY

## 2023-10-26 PROCEDURE — 3078F DIAST BP <80 MM HG: CPT | Performed by: ANESTHESIOLOGY

## 2023-10-26 PROCEDURE — 1036F TOBACCO NON-USER: CPT | Performed by: ANESTHESIOLOGY

## 2023-10-26 PROCEDURE — 3074F SYST BP LT 130 MM HG: CPT | Performed by: ANESTHESIOLOGY

## 2023-10-26 RX ORDER — TRAMADOL HYDROCHLORIDE 50 MG/1
50 TABLET ORAL EVERY 6 HOURS PRN
Qty: 120 TABLET | Refills: 0 | Status: SHIPPED | OUTPATIENT
Start: 2023-10-26 | End: 2023-10-31

## 2023-10-26 ASSESSMENT — ENCOUNTER SYMPTOMS
RESPIRATORY NEGATIVE: 1
NECK PAIN: 1
DEPRESSION: 0
NEUROLOGICAL NEGATIVE: 1
EYES NEGATIVE: 1
HEMATOLOGIC/LYMPHATIC NEGATIVE: 1
OCCASIONAL FEELINGS OF UNSTEADINESS: 0
LOSS OF SENSATION IN FEET: 0
ENDOCRINE NEGATIVE: 1
CARDIOVASCULAR NEGATIVE: 1
ARTHRALGIAS: 1
GASTROINTESTINAL NEGATIVE: 1
MYALGIAS: 1
BACK PAIN: 1
PSYCHIATRIC NEGATIVE: 1

## 2023-10-26 ASSESSMENT — COLUMBIA-SUICIDE SEVERITY RATING SCALE - C-SSRS
6. HAVE YOU EVER DONE ANYTHING, STARTED TO DO ANYTHING, OR PREPARED TO DO ANYTHING TO END YOUR LIFE?: NO
1. IN THE PAST MONTH, HAVE YOU WISHED YOU WERE DEAD OR WISHED YOU COULD GO TO SLEEP AND NOT WAKE UP?: NO
2. HAVE YOU ACTUALLY HAD ANY THOUGHTS OF KILLING YOURSELF?: NO

## 2023-10-26 ASSESSMENT — LIFESTYLE VARIABLES: TOTAL SCORE: 0

## 2023-10-26 ASSESSMENT — PATIENT HEALTH QUESTIONNAIRE - PHQ9
2. FEELING DOWN, DEPRESSED OR HOPELESS: NOT AT ALL
SUM OF ALL RESPONSES TO PHQ9 QUESTIONS 1 AND 2: 0
1. LITTLE INTEREST OR PLEASURE IN DOING THINGS: NOT AT ALL

## 2023-10-26 ASSESSMENT — PAIN SCALES - GENERAL: PAINLEVEL: 6

## 2023-10-26 NOTE — PROGRESS NOTES
This is 77 y.o.  female with who has been treated for Lower back pain. Pain is better, today the patient presents with neck pain that radiates down her traps.  She also has headaches.  Started 3 months ago  Chief Complaint   Patient presents with    Follow-up       Pain Therapies: Patient did have back injections she states she discussed little relief from mom she really cannot tell much relief but she can tell that it is slightly better    Opioid Risk Assessment Score 0/26

## 2023-10-26 NOTE — PROGRESS NOTES
SUBJECTIVE:  This is 77 y.o.  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 The patient has had right TKA and left DANIELLE and eventually L5-S1 fusion after another back surgery by Dr. Mccoy.  The patient has neck pain with radiation to both shoulders which correlate with significant degenerative changes that she has      Prior office visit:  This is 77 year year old female recall past medical history of (patient of my brother) with a history of anxiety/depression, fatigue, chronic kidney disease, history of L4-S1 fusion with disease at the L3-4 and significant spondylosis and endplate changes at L1-2 presenting with at this time mild lower back pain and left anterior shin pain in addition to arthralgia. The patient has had right TKA and left DANIELLE and eventually L5-S1 fusion after another back surgery by Dr. Royal 2021 and she received most likely above the fusion TFESI by [facility] as well which helped her with her pain and still working for now. The patient takes tramadol very rarely she has chronic kidney disease and she cannot take NSAIDs. We will refill her tramadol. We will get her involved with the [facility] as well. We will plan on bilateral L3-4 TFESI when patient calls. who is here for follow-up plan for right L3-4 TFESI to help with her right radiculopathy and refill tramadol for her. Plan on toxicology and agreement during her injection visit.             Last procedure:   Bilateral L3-4 TFESI by [facility] the patient has had a 70% improvement in pain and function     Portions of record reviewed for pertinent issues: active problem list, medication list, allergies, family history, social history, notes from last encounter, encounters, lab results, imaging  and other system records.        I have personally reviewed the OARRS report for this patient. This report is scanned into the electronic medical record. I have considered the risks of abuse, dependence, addiction and diversion. It showed tramadol from us  OPIOID RISK ASSESSMENT SCORE 2/26  Opioid agreement: 10/26/2023  Activities of daily living: Very active doing aquatic therapy 3 times a week  Adverse effects: None  Analgesia: W/O 8/10, W 3/10 takes 1 or 2 tramadol per day as needed  Toxicology screen 1/26/2023  Aberrant behavior: None  Patient is being treated with tramadol therapy for pain and is responding appropriately.  There are NO signs of tramadol intoxication, abuse, addiction or withdrawal. Pupils are equal, reactive to light bilateral, appropriate speech and cognition. Patient denies any opioid induced constipation. The OARRS registry followed periodically, urine toxicology completed and appropriate.      Patient is advised and warned in specific detail about potential benefits of opioids along with risks and side effects including, but not limited to, dependency, addiction, tolerance, hyperalgesia, anxiety, depression, insomnia, endocrine changes, immunologic disturbances, respiratory depression and death.      Caution advised regarding the use of medications prescribed at this office and specific mention made regarding not to drive or operate heavy machinery if feeling side effects from this the medications. Patient expressed an understanding in regards to these particular concerns.      Discussed non-opioid options including (But no limited), physical wellness, antiinflammatory diet, icing and heating, meditation, relaxation, massage and acupuncture.     We will continue to monitor and adjust medications as needed.           Pending law suits: Used to work for a [employer] company for many years  Social History: Single now lives in [facility] her friend is a patient of mine, no  "children, finished masters degree in counseling, denies smoking drinking or use of illicit drugs              Diagnostic studies:  11/10/2022 MRI of the lumbar spine showed L4-S1 fusion with disease above the fusion at the L3-4 in addition to disc herniation at the L2-3. No bone marrow edema and no endplate changes report was scanned into the system             /65   Pulse 66   Temp 36.5 °C (97.7 °F)   Resp 16   Ht 1.727 m (5' 8\")   Wt 85.3 kg (188 lb)   SpO2 92%   BMI 28.59 kg/m²    Review of Systems   HENT: Negative.     Eyes: Negative.    Respiratory: Negative.     Cardiovascular: Negative.    Gastrointestinal: Negative.    Endocrine: Negative.    Genitourinary: Negative.    Musculoskeletal:  Positive for arthralgias, back pain, myalgias and neck pain.   Skin: Negative.    Neurological: Negative.    Hematological: Negative.    Psychiatric/Behavioral: Negative.        Physical Exam  Vitals and nursing note reviewed.   Constitutional:       Appearance: Normal appearance.   HENT:      Head: Normocephalic and atraumatic.      Nose: Nose normal.   Eyes:      Extraocular Movements: Extraocular movements intact.      Conjunctiva/sclera: Conjunctivae normal.      Pupils: Pupils are equal, round, and reactive to light.   Cardiovascular:      Rate and Rhythm: Normal rate and regular rhythm.      Pulses: Normal pulses.      Heart sounds: Normal heart sounds.   Pulmonary:      Effort: Pulmonary effort is normal.      Breath sounds: Normal breath sounds.   Abdominal:      General: Abdomen is flat. Bowel sounds are normal.      Palpations: Abdomen is soft.   Musculoskeletal:         General: Tenderness present.      Comments: Uses the walker to walk, decreased range of motion of cervical spine with positive Spurling on the left more than the right   Skin:     General: Skin is warm.   Neurological:      General: No focal deficit present.      Mental Status: She is alert and oriented to person, place, and time. "   Psychiatric:         Mood and Affect: Mood normal.         Behavior: Behavior normal.                Plan  At least 50% of the visit was involved in the discussion of the options for treatment. We discussed exercises, medication, interventional therapies and surgery. Healthy life style is essential with patient hard work to achieve the wellness. In addition; discussion with the patient and/or family about any of the diagnostic results, impressions and/or recommended diagnostic studies, prognosis, risks and benefits of treatment options, instructions for treatment and/or follow-up, importance of compliance with chosen treatment options, risk-factor reduction, and patient/family education.         Pool therapy, walking in the pool, at least 3x per week for 30 minutes  Continue self-directed physical therapy  Refill tramadol  Bilateral C6-7 interlaminar ADONIS patient declined lorazepam  Alternative chronic pain therapies was discussed, encouraged and information was handed  Return to Clinic after injection     *Please note this report has been produced using speech recognition software and may contain errors related to that system including grammar, punctuation and spelling as well as words and phrases that may be inappropriate. If there are questions or concerns, please feel free to contact me to clarify.    Rey Adair MD

## 2023-10-26 NOTE — H&P (VIEW-ONLY)
SUBJECTIVE:  This is 77 y.o.  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 The patient has had right TKA and left DANIELLE and eventually L5-S1 fusion after another back surgery by Dr. Mccoy.  The patient has neck pain with radiation to both shoulders which correlate with significant degenerative changes that she has      Prior office visit:  This is 77 year year old female recall past medical history of (patient of my brother) with a history of anxiety/depression, fatigue, chronic kidney disease, history of L4-S1 fusion with disease at the L3-4 and significant spondylosis and endplate changes at L1-2 presenting with at this time mild lower back pain and left anterior shin pain in addition to arthralgia. The patient has had right TKA and left DANIELLE and eventually L5-S1 fusion after another back surgery by Dr. Royal 2021 and she received most likely above the fusion TFESI by [facility] as well which helped her with her pain and still working for now. The patient takes tramadol very rarely she has chronic kidney disease and she cannot take NSAIDs. We will refill her tramadol. We will get her involved with the [facility] as well. We will plan on bilateral L3-4 TFESI when patient calls. who is here for follow-up plan for right L3-4 TFESI to help with her right radiculopathy and refill tramadol for her. Plan on toxicology and agreement during her injection visit.             Last procedure:   Bilateral L3-4 TFESI by [facility] the patient has had a 70% improvement in pain and function     Portions of record reviewed for pertinent issues: active problem list, medication list, allergies, family history, social history, notes from last encounter, encounters, lab results, imaging  and other system records.        I have personally reviewed the OARRS report for this patient. This report is scanned into the electronic medical record. I have considered the risks of abuse, dependence, addiction and diversion. It showed tramadol from us  OPIOID RISK ASSESSMENT SCORE 2/26  Opioid agreement: 10/26/2023  Activities of daily living: Very active doing aquatic therapy 3 times a week  Adverse effects: None  Analgesia: W/O 8/10, W 3/10 takes 1 or 2 tramadol per day as needed  Toxicology screen 1/26/2023  Aberrant behavior: None  Patient is being treated with tramadol therapy for pain and is responding appropriately.  There are NO signs of tramadol intoxication, abuse, addiction or withdrawal. Pupils are equal, reactive to light bilateral, appropriate speech and cognition. Patient denies any opioid induced constipation. The OARRS registry followed periodically, urine toxicology completed and appropriate.      Patient is advised and warned in specific detail about potential benefits of opioids along with risks and side effects including, but not limited to, dependency, addiction, tolerance, hyperalgesia, anxiety, depression, insomnia, endocrine changes, immunologic disturbances, respiratory depression and death.      Caution advised regarding the use of medications prescribed at this office and specific mention made regarding not to drive or operate heavy machinery if feeling side effects from this the medications. Patient expressed an understanding in regards to these particular concerns.      Discussed non-opioid options including (But no limited), physical wellness, antiinflammatory diet, icing and heating, meditation, relaxation, massage and acupuncture.     We will continue to monitor and adjust medications as needed.           Pending law suits: Used to work for a [employer] company for many years  Social History: Single now lives in [facility] her friend is a patient of mine, no  "children, finished masters degree in counseling, denies smoking drinking or use of illicit drugs              Diagnostic studies:  11/10/2022 MRI of the lumbar spine showed L4-S1 fusion with disease above the fusion at the L3-4 in addition to disc herniation at the L2-3. No bone marrow edema and no endplate changes report was scanned into the system             /65   Pulse 66   Temp 36.5 °C (97.7 °F)   Resp 16   Ht 1.727 m (5' 8\")   Wt 85.3 kg (188 lb)   SpO2 92%   BMI 28.59 kg/m²    Review of Systems   HENT: Negative.     Eyes: Negative.    Respiratory: Negative.     Cardiovascular: Negative.    Gastrointestinal: Negative.    Endocrine: Negative.    Genitourinary: Negative.    Musculoskeletal:  Positive for arthralgias, back pain, myalgias and neck pain.   Skin: Negative.    Neurological: Negative.    Hematological: Negative.    Psychiatric/Behavioral: Negative.        Physical Exam  Vitals and nursing note reviewed.   Constitutional:       Appearance: Normal appearance.   HENT:      Head: Normocephalic and atraumatic.      Nose: Nose normal.   Eyes:      Extraocular Movements: Extraocular movements intact.      Conjunctiva/sclera: Conjunctivae normal.      Pupils: Pupils are equal, round, and reactive to light.   Cardiovascular:      Rate and Rhythm: Normal rate and regular rhythm.      Pulses: Normal pulses.      Heart sounds: Normal heart sounds.   Pulmonary:      Effort: Pulmonary effort is normal.      Breath sounds: Normal breath sounds.   Abdominal:      General: Abdomen is flat. Bowel sounds are normal.      Palpations: Abdomen is soft.   Musculoskeletal:         General: Tenderness present.      Comments: Uses the walker to walk, decreased range of motion of cervical spine with positive Spurling on the left more than the right   Skin:     General: Skin is warm.   Neurological:      General: No focal deficit present.      Mental Status: She is alert and oriented to person, place, and time. "   Psychiatric:         Mood and Affect: Mood normal.         Behavior: Behavior normal.                Plan  At least 50% of the visit was involved in the discussion of the options for treatment. We discussed exercises, medication, interventional therapies and surgery. Healthy life style is essential with patient hard work to achieve the wellness. In addition; discussion with the patient and/or family about any of the diagnostic results, impressions and/or recommended diagnostic studies, prognosis, risks and benefits of treatment options, instructions for treatment and/or follow-up, importance of compliance with chosen treatment options, risk-factor reduction, and patient/family education.         Pool therapy, walking in the pool, at least 3x per week for 30 minutes  Continue self-directed physical therapy  Refill tramadol  Bilateral C6-7 interlaminar ADONIS patient declined lorazepam  Alternative chronic pain therapies was discussed, encouraged and information was handed  Return to Clinic after injection     *Please note this report has been produced using speech recognition software and may contain errors related to that system including grammar, punctuation and spelling as well as words and phrases that may be inappropriate. If there are questions or concerns, please feel free to contact me to clarify.    Rey Adair MD

## 2023-10-30 NOTE — TELEPHONE ENCOUNTER
Please clarify instructions and resend the medication.      traMADol (Ultram) 50 mg tablet [[id number]]    Order Details  Dose: 50 mg Route: oral Frequency: Every 6 hours PRN for severe pain (7 - 10)   Dispense Quantity: 120 tablet Refills: 0          Sig: Take 1 tablet (50 mg) by mouth every 6 hours if needed for severe pain (7 - 10) for up to 5 days. Take with Acetaminophen 1000 mg QID PRN         Start Date: 10/26/23 End Date: 10/31/23   Written Date: 10/26/23 Rx Expiration Date: 04/23/24

## 2023-11-07 ENCOUNTER — APPOINTMENT (OUTPATIENT)
Dept: PAIN MEDICINE | Facility: CLINIC | Age: 78
End: 2023-11-07
Payer: MEDICARE

## 2023-11-07 ENCOUNTER — APPOINTMENT (OUTPATIENT)
Dept: RADIOLOGY | Facility: CLINIC | Age: 78
End: 2023-11-07
Payer: MEDICARE

## 2023-11-13 ENCOUNTER — ANCILLARY PROCEDURE (OUTPATIENT)
Dept: RADIOLOGY | Facility: CLINIC | Age: 78
End: 2023-11-13
Payer: MEDICARE

## 2023-11-13 ENCOUNTER — HOSPITAL ENCOUNTER (OUTPATIENT)
Dept: PAIN MEDICINE | Facility: CLINIC | Age: 78
Discharge: HOME | End: 2023-11-13
Payer: MEDICARE

## 2023-11-13 VITALS
SYSTOLIC BLOOD PRESSURE: 111 MMHG | HEIGHT: 68 IN | BODY MASS INDEX: 27.89 KG/M2 | WEIGHT: 184 LBS | RESPIRATION RATE: 16 BRPM | DIASTOLIC BLOOD PRESSURE: 65 MMHG | HEART RATE: 73 BPM | TEMPERATURE: 96.8 F | OXYGEN SATURATION: 96 %

## 2023-11-13 DIAGNOSIS — M43.16 SPONDYLOLISTHESIS AT L4-L5 LEVEL: ICD-10-CM

## 2023-11-13 DIAGNOSIS — M47.22 CERVICAL SPONDYLOSIS WITH RADICULOPATHY: ICD-10-CM

## 2023-11-13 DIAGNOSIS — F51.01 PRIMARY INSOMNIA: ICD-10-CM

## 2023-11-13 DIAGNOSIS — G89.4 CHRONIC PAIN SYNDROME: Primary | ICD-10-CM

## 2023-11-13 PROCEDURE — 77003 FLUOROGUIDE FOR SPINE INJECT: CPT

## 2023-11-13 PROCEDURE — 2500000004 HC RX 250 GENERAL PHARMACY W/ HCPCS (ALT 636 FOR OP/ED)

## 2023-11-13 PROCEDURE — 7100000009 HC PHASE TWO TIME - INITIAL BASE CHARGE

## 2023-11-13 PROCEDURE — 7100000010 HC PHASE TWO TIME - EACH INCREMENTAL 1 MINUTE

## 2023-11-13 PROCEDURE — 62321 NJX INTERLAMINAR CRV/THRC: CPT | Performed by: ANESTHESIOLOGY

## 2023-11-13 PROCEDURE — 2500000005 HC RX 250 GENERAL PHARMACY W/O HCPCS

## 2023-11-13 RX ORDER — METHYLPREDNISOLONE ACETATE 80 MG/ML
INJECTION, SUSPENSION INTRA-ARTICULAR; INTRALESIONAL; INTRAMUSCULAR; SOFT TISSUE
Status: COMPLETED
Start: 2023-11-13 | End: 2023-11-13

## 2023-11-13 RX ORDER — LIDOCAINE HYDROCHLORIDE AND EPINEPHRINE 10; 10 MG/ML; UG/ML
INJECTION, SOLUTION INFILTRATION; PERINEURAL
Status: COMPLETED
Start: 2023-11-13 | End: 2023-11-13

## 2023-11-13 RX ORDER — TRAZODONE HYDROCHLORIDE 50 MG/1
25 TABLET ORAL NIGHTLY
Qty: 45 TABLET | Refills: 5 | Status: SHIPPED | OUTPATIENT
Start: 2023-11-13

## 2023-11-13 RX ORDER — LIDOCAINE HYDROCHLORIDE 5 MG/ML
INJECTION, SOLUTION INFILTRATION; INTRAVENOUS
Status: COMPLETED
Start: 2023-11-13 | End: 2023-11-13

## 2023-11-13 RX ORDER — TRAMADOL HYDROCHLORIDE 50 MG/1
50 TABLET ORAL EVERY 6 HOURS PRN
Qty: 120 TABLET | Refills: 2 | Status: SHIPPED | OUTPATIENT
Start: 2023-11-13

## 2023-11-13 RX ORDER — ASPIRIN 81 MG/1
81 TABLET ORAL DAILY
COMMUNITY

## 2023-11-13 RX ADMIN — LIDOCAINE HYDROCHLORIDE,EPINEPHRINE BITARTRATE 10 ML: 10; .01 INJECTION, SOLUTION INFILTRATION; PERINEURAL at 13:49

## 2023-11-13 RX ADMIN — LIDOCAINE HYDROCHLORIDE 250 MG: 5 INJECTION, SOLUTION INFILTRATION at 13:49

## 2023-11-13 RX ADMIN — METHYLPREDNISOLONE ACETATE 80 MG: 80 INJECTION, SUSPENSION INTRA-ARTICULAR; INTRALESIONAL; INTRAMUSCULAR; SOFT TISSUE at 13:49

## 2023-11-13 ASSESSMENT — COLUMBIA-SUICIDE SEVERITY RATING SCALE - C-SSRS
6. HAVE YOU EVER DONE ANYTHING, STARTED TO DO ANYTHING, OR PREPARED TO DO ANYTHING TO END YOUR LIFE?: NO
2. HAVE YOU ACTUALLY HAD ANY THOUGHTS OF KILLING YOURSELF?: NO
1. IN THE PAST MONTH, HAVE YOU WISHED YOU WERE DEAD OR WISHED YOU COULD GO TO SLEEP AND NOT WAKE UP?: NO

## 2023-11-13 ASSESSMENT — PAIN - FUNCTIONAL ASSESSMENT: PAIN_FUNCTIONAL_ASSESSMENT: 0-10

## 2023-11-13 ASSESSMENT — PAIN SCALES - GENERAL
PAINLEVEL_OUTOF10: 6
PAINLEVEL_OUTOF10: 8

## 2023-11-13 NOTE — OP NOTE
PRE-OPERATIVE DIAGNOSIS: Cervical spondylosis with radiculopathy     POST-OPERATIVE DIAGNOSIS:  The same.  ESTIMATED BLOOD LOSS:  None.  Complications: None    PROCEDURE: [id number] Epidural.      COMPLICATIONS:  None    INDICATIONS: This patient is a pleasant 77 y.o.  female with a significant history of Cervical pain with radiculopathy Physical exam and Radiological findings correlate with the pre-operative diagnoses.  Because of these findings we elected to proceed with cervical epidural steroid injection at the affected level.     PROCEDURE: After sufficient consent was signed, the patient was brought to the Operating Room, and placed in a prone position with a pillow underneath the chest. The patient's neck was in a flexed position. The neck and upper thoracic area were then prepped and draped in the usual fashion.     Under fluoroscopic guidance, the  leftC7-T1 interspace was identified. The skin at the entry site was infiltrated with 1% Lidocaine using a size 25 gauge needle. A Touhy needle 18G, 3.5 inch was introduced gradually until the inferior lamina of the interspace was encountered. The needle was gradually advanced until it entered into the ligament. At that time, loss of resistance technique was used, and the needle advanced gradually until the negative pressure was noticed in the epidural space. Next 1cc of Omnipaque 300 were injected. This revealed excellent distribution of the dye in the epidural space, and around the nerve roots of the affected side of the neck and no vascular involvement. At that time, a mixture of Depo-Medrol 80 mg mg, Lidocaine 1% + Epi 1/100.000, 20 mg  were injected . The needle was flushed and removed.     The patient tolerated the procedure very well and was transferred to the Recovery Room in stable condition.  The patient had stable resolution of symptoms.  Patient to follow-up in the Pain Management Clinic as scheduled.

## 2023-11-13 NOTE — Clinical Note
Prepped with ChloraPrep, a minimum of 3 minute dry time, longer if needed, no pooling noted, patient draped in sterile fashion. Cevical

## 2023-12-05 ENCOUNTER — ALLIED HEALTH (OUTPATIENT)
Dept: INTEGRATIVE MEDICINE | Facility: CLINIC | Age: 78
End: 2023-12-05
Payer: MEDICARE

## 2023-12-05 DIAGNOSIS — Z71.82 EXERCISE COUNSELING: ICD-10-CM

## 2023-12-05 DIAGNOSIS — Z71.3 ENCOUNTER FOR NUTRITIONAL COUNSELING: Primary | ICD-10-CM

## 2023-12-05 PROCEDURE — 99215 OFFICE O/P EST HI 40 MIN: CPT | Performed by: PHYSICIAN ASSISTANT

## 2023-12-05 NOTE — PATIENT INSTRUCTIONS
Keep up the good work with sleep optimization, exercise, and nutrition changes!  Nutrition:  Goal of increasing diversity of nutrient dense, high fiber foods:  Beans, lentils, hummus  Walnuts, pecans, bishop seed  Vegetables of all colors   See Daily Dozen handout   Recipe:  Bishop seed parfait

## 2023-12-05 NOTE — PROGRESS NOTES
Integrative Medicine Consult:    Successes:  Created goal to exercise 6x/week  Started aquatic yoga, chair yoga, chair aerobics-- really enjoying this; able to modify when needed  Had questions about B12 vs. B complex-- reviewed labels and dosing recommendations  Nutrition- mindful of gluten consumption  Eliminated OJ--eating an orange instead + diluted pomegranate/cranberry juice  Reducing wine intake by diluting it   Goal of one salad/day  Consumes fish when available  Sleep- 8 hours/night, well-rested  Prescriped escitalopram and bupropion; switched to sertraline; plans to stay on sertraline + bupropion which was effective for her years ago. Recommend to discuss and Rx changes with prescribing provider.    Challenges:  Unable to walk easily due to prior RLE injury- abductor and glute minimus tear   Developed cervical spine pain several months ago-- completed cervical epidural block by Dr. Adair (very effective)  LLE pain- some knee pain intermittently; no redness, swelling, limited ROM, or red flag s/s. Recommend to discuss with orthopedic if pain worsens.       Assessment/Plan:  - Congratulated on success with exercise and nutritional changes!  - Nutrition:   Focus on increasing fiber-- to add more beans, lentils, hummus with crackers   Walnuts, pecans, bishop seed for omega 3 if unable to consume fish  Bishop seed parfait recipe handout        ROS:  Constitutional: afebrile  Respiratory: no shortness of breath  Cardiovascular: no chest  pain  Abdominal: no abdominal pain, no n/v/d  Musculoskeletal: no joint pain or swelling   Skin: no rash    Physical Exam:  General: alert and oriented; well-developed, well-nourished, no acute distress  HEENT: normocephalic, atraumatic, good eye contact  Respiratory: no labored breathing, no conversational dyspnea  Musculoskeletal: moving all extremities appropriately  Neurology: normal gait  Psych: pleasant affect, cooperative

## 2023-12-06 ENCOUNTER — PATIENT MESSAGE (OUTPATIENT)
Dept: PRIMARY CARE | Facility: CLINIC | Age: 78
End: 2023-12-06
Payer: MEDICARE

## 2023-12-06 DIAGNOSIS — F33.41 RECURRENT MAJOR DEPRESSIVE DISORDER, IN PARTIAL REMISSION (CMS-HCC): ICD-10-CM

## 2023-12-06 DIAGNOSIS — F41.1 GENERALIZED ANXIETY DISORDER: Primary | ICD-10-CM

## 2023-12-08 RX ORDER — SERTRALINE HYDROCHLORIDE 50 MG/1
50 TABLET, FILM COATED ORAL DAILY
Qty: 90 TABLET | Refills: 1 | Status: SHIPPED | OUTPATIENT
Start: 2023-12-08 | End: 2024-04-10 | Stop reason: SDUPTHER

## 2024-01-03 ENCOUNTER — TELEPHONE (OUTPATIENT)
Dept: PRIMARY CARE | Facility: CLINIC | Age: 79
End: 2024-01-03

## 2024-01-03 ENCOUNTER — ANCILLARY PROCEDURE (OUTPATIENT)
Dept: RADIOLOGY | Facility: CLINIC | Age: 79
End: 2024-01-03
Payer: MEDICARE

## 2024-01-03 ENCOUNTER — OFFICE VISIT (OUTPATIENT)
Dept: PRIMARY CARE | Facility: CLINIC | Age: 79
End: 2024-01-03
Payer: MEDICARE

## 2024-01-03 VITALS
HEIGHT: 68 IN | HEART RATE: 83 BPM | OXYGEN SATURATION: 99 % | SYSTOLIC BLOOD PRESSURE: 132 MMHG | WEIGHT: 182 LBS | BODY MASS INDEX: 27.58 KG/M2 | DIASTOLIC BLOOD PRESSURE: 76 MMHG

## 2024-01-03 DIAGNOSIS — J40 BRONCHITIS: ICD-10-CM

## 2024-01-03 DIAGNOSIS — J40 BRONCHITIS: Primary | ICD-10-CM

## 2024-01-03 PROBLEM — M25.511 PAIN IN RIGHT SHOULDER: Status: ACTIVE | Noted: 2022-01-13

## 2024-01-03 PROBLEM — R60.9 EDEMA: Status: RESOLVED | Noted: 2024-01-03 | Resolved: 2024-01-03

## 2024-01-03 PROBLEM — K59.00 CONSTIPATION, UNSPECIFIED: Status: RESOLVED | Noted: 2022-01-13 | Resolved: 2024-01-03

## 2024-01-03 PROBLEM — G89.4 CHRONIC PAIN SYNDROME: Status: ACTIVE | Noted: 2024-01-03

## 2024-01-03 PROBLEM — I12.9 HYPERTENSIVE CHRONIC KIDNEY DISEASE WITH STAGE 1 THROUGH STAGE 4 CHRONIC KIDNEY DISEASE, OR UNSPECIFIED CHRONIC KIDNEY DISEASE: Status: ACTIVE | Noted: 2022-01-13

## 2024-01-03 PROBLEM — J06.9 VIRAL UPPER RESPIRATORY TRACT INFECTION: Status: RESOLVED | Noted: 2024-01-03 | Resolved: 2024-01-03

## 2024-01-03 PROBLEM — D64.9 ANEMIA: Status: RESOLVED | Noted: 2024-01-03 | Resolved: 2024-01-03

## 2024-01-03 PROBLEM — M25.539 PAIN IN WRIST: Status: RESOLVED | Noted: 2024-01-03 | Resolved: 2024-01-03

## 2024-01-03 PROBLEM — R26.2 DIFFICULTY IN WALKING, NOT ELSEWHERE CLASSIFIED: Status: ACTIVE | Noted: 2022-01-13

## 2024-01-03 PROBLEM — J20.9 ACUTE BRONCHITIS: Status: RESOLVED | Noted: 2024-01-03 | Resolved: 2024-01-03

## 2024-01-03 PROBLEM — R30.0 DYSURIA: Status: RESOLVED | Noted: 2024-01-03 | Resolved: 2024-01-03

## 2024-01-03 PROCEDURE — 71046 X-RAY EXAM CHEST 2 VIEWS: CPT

## 2024-01-03 PROCEDURE — 3075F SYST BP GE 130 - 139MM HG: CPT | Performed by: FAMILY MEDICINE

## 2024-01-03 PROCEDURE — 1125F AMNT PAIN NOTED PAIN PRSNT: CPT | Performed by: FAMILY MEDICINE

## 2024-01-03 PROCEDURE — 99213 OFFICE O/P EST LOW 20 MIN: CPT | Performed by: FAMILY MEDICINE

## 2024-01-03 PROCEDURE — 1036F TOBACCO NON-USER: CPT | Performed by: FAMILY MEDICINE

## 2024-01-03 PROCEDURE — 3078F DIAST BP <80 MM HG: CPT | Performed by: FAMILY MEDICINE

## 2024-01-03 PROCEDURE — 71046 X-RAY EXAM CHEST 2 VIEWS: CPT | Performed by: STUDENT IN AN ORGANIZED HEALTH CARE EDUCATION/TRAINING PROGRAM

## 2024-01-03 RX ORDER — DOXYCYCLINE HYCLATE 100 MG
100 TABLET ORAL 2 TIMES DAILY
COMMUNITY
Start: 2023-12-27 | End: 2024-01-16 | Stop reason: ALTCHOICE

## 2024-01-03 NOTE — TELEPHONE ENCOUNTER
If patient calls tomorrow with her update medication list, please call me and let me know so I can update her chart and make changes if needed.

## 2024-01-03 NOTE — PROGRESS NOTES
"Chief complaint:   Chief Complaint   Patient presents with    Cough     2 weeks, green phlegm       HPI:  Carmen Bauman is a 78 y.o. female who presents for evaluation of cough x 2 weeks. She went to urgent care 12/27/2023 and was dx with bronchitis. She had a negative COVID-19 test. She was given Prednisone and Doxycycline which she is still completing. She improved with it but sx have not resolved.     Physical exam:  /76   Pulse 83   Ht 1.727 m (5' 8\")   Wt 82.6 kg (182 lb)   SpO2 99%   BMI 27.67 kg/m²   General: NAD, well appearing female  Head: normocephalic  Ears: TM normal bilaterally  Nose: moist, mild eryhtema  Mouth: moist   Heart: RRR, no mumur appreciated  Lungs: rhonchi appreciated and rales worse right mid lung but bilaterally with faint expiratory wheeze    Assessment/Plan   Problem List Items Addressed This Visit    None  Visit Diagnoses       Bronchitis    -  Primary    Relevant Orders    XR chest 2 views        CXR to be completed today, continue current abx, has complete course of Prednisone. Humberto unclear on the inhaler she is using currently and will call today or tomorrow when she gets home with update so as to potentially make some adjustment to her medication. Fluticasone 110 mcg/actuation 2 puffs BID is listed in her medication list but she thinks she has a different combination medication.     Ruba Maher, DO        "

## 2024-01-04 NOTE — TELEPHONE ENCOUNTER
Pt called in and gave me an update on the 2 meds. Pt also stated that she sent a my chart mess to DR. CAMPBELL. Message was forward to Dr. CAMPBELL and Dr. CAMPBELL was called with meds. Also Dr. CAMPBELL was notified that pt did her x-ray last night and asked to review results.

## 2024-01-09 NOTE — TELEPHONE ENCOUNTER
I spoke with Pt and she states that her breathing has improved with the use of the inhalers, she also states that her cough is still lingering but has gotten better, she also states that she has been using cold medicine as well as she has not been feeling well, she has an appointment with her allergist Dr. Yeh next week.

## 2024-01-10 ENCOUNTER — APPOINTMENT (OUTPATIENT)
Dept: ORTHOPEDIC SURGERY | Facility: CLINIC | Age: 79
End: 2024-01-10
Payer: MEDICARE

## 2024-01-10 NOTE — PROGRESS NOTES
SUBJECTIVE:  This is 78 y.o.  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 The patient has had right TKA and left DANIELLE and eventually L5-S1 fusion after another back surgery by Dr. Mccoy.  The patient has neck pain with radiation to both shoulders which correlate with significant degenerative changes that she has             Last procedure:   9/11/2023 bilateral C6-7 cervical ADONIS patient has had 80% improvement in pain and function  9/11/2023 right L3-4 TFESI  the patient has had a 90% improvement in pain and function     Portions of record reviewed for pertinent issues: active problem list, medication list, allergies, family history, social history, notes from last encounter, encounters, lab results, imaging and other system records.        I have personally reviewed the [place]AR[place] report for this patient. This report is scanned into the electronic medical record. I have considered the risks of abuse, dependence, addiction and diversion. It showed tramadol from us  OPIOID RISK ASSESSMENT  SCORE 2/26  Opioid agreement: 10/26/2023  Activities of daily living: Very active doing aquatic therapy 3 times a week  Adverse effects: None  Analgesia: W/O 8/10, W 3/10 takes 1 or 2 tramadol per day as needed  Toxicology screen 1/26/2023  Aberrant behavior: None  Patient is being treated with tramadol therapy for pain and is responding appropriately.  There are NO signs of tramadol intoxication, abuse, addiction or withdrawal. Pupils are equal, reactive to light bilateral, appropriate speech and cognition. Patient denies any opioid induced constipation. The OARRS registry followed periodically, urine toxicology completed and appropriate.      Patient is advised and warned in specific detail about potential benefits of opioids along with risks and side effects including, but not limited to, dependency, addiction, tolerance, hyperalgesia, anxiety, depression, insomnia, endocrine changes, immunologic disturbances, respiratory depression and death.      Caution advised regarding the use of medications prescribed at this office and specific mention made regarding not to drive or operate heavy machinery if feeling side effects from this the medications. Patient expressed an understanding in regards to these particular concerns.      Discussed non-opioid options including (But no limited), physical wellness, antiinflammatory diet, icing and heating, meditation, relaxation, massage and acupuncture.     We will continue to monitor and adjust medications as needed.           Employment/pending law suits: Used to work for a [employer] company for many years  Social History: Single now lives in [facility] her friend is a patient of mine, no children, finished masters degree in counseling, denies smoking drinking or use of illicit drugs                  Diagnostic studies:  11/10/2022 MRI of the lumbar spine showed L4-S1 fusion with disease above the fusion at the L3-4 in addition to disc herniation at the  L2-3. No bone marrow edema and no endplate changes report was scanned into the system           Review of Systems   HENT: Negative.     Eyes: Negative.    Respiratory: Negative.     Cardiovascular: Negative.    Gastrointestinal: Negative.    Endocrine: Negative.    Genitourinary: Negative.    Musculoskeletal:  Positive for arthralgias, back pain, myalgias and neck pain.   Skin: Negative.    Neurological: Negative.    Hematological: Negative.    Psychiatric/Behavioral: Negative.          Physical Exam  Vitals and nursing note reviewed.   Constitutional:       Appearance: Normal appearance.   HENT:      Head: Normocephalic and atraumatic.      Nose: Nose normal.   Eyes:      Extraocular Movements: Extraocular movements intact.      Conjunctiva/sclera: Conjunctivae normal.      Pupils: Pupils are equal, round, and reactive to light.   Cardiovascular:      Rate and Rhythm: Normal rate and regular rhythm.      Pulses: Normal pulses.      Heart sounds: Normal heart sounds.   Pulmonary:      Effort: Pulmonary effort is normal.      Breath sounds: Normal breath sounds.   Abdominal:      General: Abdomen is flat. Bowel sounds are normal.      Palpations: Abdomen is soft.   Musculoskeletal:         General: Tenderness present.   Skin:     General: Skin is warm.   Neurological:      General: No focal deficit present.      Mental Status: She is alert and oriented to person, place, and time.   Psychiatric:         Mood and Affect: Mood normal.         Behavior: Behavior normal.                      Plan  At least 50% of the visit was involved in the discussion of the options for treatment. We discussed exercises, medication, interventional therapies and surgery. Healthy life style is essential with patient hard work to achieve the wellness. In addition; discussion with the patient and/or family about any of the diagnostic results, impressions and/or recommended diagnostic studies, prognosis, risks and benefits of treatment  options, instructions for treatment and/or follow-up, importance of compliance with chosen treatment options, risk-factor reduction, and patient/family education.         Pool therapy, walking in the pool, at least 3x per week for 30 minutes  Continue self-directed physical therapy  Tramadol 120 Last her for few months  Consider repeat bilateral C6-7 ADONIS when patient calls  Consider repeat right or bilateral L3-4 TFESI when patient calls  Healthy lifestyle and anti-inflammatory diet in addition to weight control discussed with the patient  Alternative chronic pain therapies was discussed, encouraged and information was handed  Return to Clinic 3 months     *Please note this report has been produced using speech recognition software and may contain errors related to that system including grammar, punctuation and spelling as well as words and phrases that may be inappropriate. If there are questions or concerns, please feel free to contact me to clarify.    Rey Adair MD

## 2024-01-11 ENCOUNTER — OFFICE VISIT (OUTPATIENT)
Dept: PAIN MEDICINE | Facility: CLINIC | Age: 79
End: 2024-01-11
Payer: MEDICARE

## 2024-01-11 VITALS
WEIGHT: 182 LBS | HEART RATE: 66 BPM | RESPIRATION RATE: 16 BRPM | HEIGHT: 68 IN | DIASTOLIC BLOOD PRESSURE: 80 MMHG | BODY MASS INDEX: 27.58 KG/M2 | OXYGEN SATURATION: 93 % | TEMPERATURE: 98.2 F | SYSTOLIC BLOOD PRESSURE: 143 MMHG

## 2024-01-11 DIAGNOSIS — M47.27 LUMBOSACRAL SPONDYLOSIS WITH RADICULOPATHY: Primary | ICD-10-CM

## 2024-01-11 DIAGNOSIS — M47.22 CERVICAL SPONDYLOSIS WITH RADICULOPATHY: ICD-10-CM

## 2024-01-11 PROCEDURE — 99214 OFFICE O/P EST MOD 30 MIN: CPT | Performed by: ANESTHESIOLOGY

## 2024-01-11 PROCEDURE — 1036F TOBACCO NON-USER: CPT | Performed by: ANESTHESIOLOGY

## 2024-01-11 PROCEDURE — 3079F DIAST BP 80-89 MM HG: CPT | Performed by: ANESTHESIOLOGY

## 2024-01-11 PROCEDURE — 3077F SYST BP >= 140 MM HG: CPT | Performed by: ANESTHESIOLOGY

## 2024-01-11 PROCEDURE — 1125F AMNT PAIN NOTED PAIN PRSNT: CPT | Performed by: ANESTHESIOLOGY

## 2024-01-11 ASSESSMENT — ENCOUNTER SYMPTOMS
ENDOCRINE NEGATIVE: 1
EYES NEGATIVE: 1
GASTROINTESTINAL NEGATIVE: 1
MYALGIAS: 1
BACK PAIN: 1
OCCASIONAL FEELINGS OF UNSTEADINESS: 0
HEMATOLOGIC/LYMPHATIC NEGATIVE: 1
NECK PAIN: 1
CARDIOVASCULAR NEGATIVE: 1
DEPRESSION: 0
PSYCHIATRIC NEGATIVE: 1
RESPIRATORY NEGATIVE: 1
NEUROLOGICAL NEGATIVE: 1
ARTHRALGIAS: 1
LOSS OF SENSATION IN FEET: 0

## 2024-01-11 ASSESSMENT — PAIN SCALES - GENERAL
PAINLEVEL_OUTOF10: 8
PAINLEVEL: 8

## 2024-01-11 ASSESSMENT — PAIN DESCRIPTION - DESCRIPTORS: DESCRIPTORS: ACHING;STABBING

## 2024-01-11 ASSESSMENT — PAIN - FUNCTIONAL ASSESSMENT: PAIN_FUNCTIONAL_ASSESSMENT: 0-10

## 2024-01-11 NOTE — PROGRESS NOTES
This is 78 y.o.  female with who has been treated for Cervical pain . Pain is better, The pain is described as achiness and is relieved by nothing .ere for follow-up   Chief Complaint   Patient presents with    Follow-up     11/13/2023 bilateralC7-V5Hywkzuzh Epidural.       Patient states she got some great improvement for 50 to 70% pain relief.  Patient would also like to discuss having pain in her right buttock.  Pain Therapies: Injections

## 2024-01-15 NOTE — ASSESSMENT & PLAN NOTE
ACT is 12.     She recently had an exacerbation due to illness.  She had been diagnosed with bronchitis and required antibiotics and steroids.  She is approximately 80% better at this time.  She will continue the Advair 250/50 twice a day.  Unfortunately, her history is not completely clear and I am unsure if she was using the Advair prior to her illness on a regular basis.  I explained to her that she should continue the Advair twice a day every day.    I would like her to follow-up in 8 weeks and have a pulmonary function test.

## 2024-01-15 NOTE — PROGRESS NOTES
"    Subjective   Patient ID:   [id number]   Carmen Bauman is a 78 y.o. female who presents for Asthma and Allergies ( 6 month follow up).    Chief Complaint   Patient presents with    Asthma    Allergies      6 month follow up        Since last visit, 07-, patient states the week prior to Alison she was sick with wheezing and cough productive of mucus, which was difficult to bring up and stuck.  She went to /[facility] 12/27/203 where she was Dxd with bronchitis and prescribed prednisone and doxycycline.      Patient then saw Dr. Maher and was improved, but had severe, cough productive of mucus that was very hard to bring up.  She was also waking in the middle of the night wheezing.  Dr. Maher did not prescribe steroids or antibiotics at that time, but prescribed Advair, which was prescribed here last visit.  Dr. Maher ordered a CXR for chest \"rattle\" that was negative, per patient.  She was using the Advair prior to having bronchitis and it did provide relief.  Dr. Maher told her she should use her albuterol morning and afternoon every 4-6 hours while sick.  Prior to her illness, she was using albuterol less than 3x a week.    Patient does get SOB with ambulation before and after her bronchitis.  Patient does have an incentives spirometer at home and she has been using it, though she can likely use it more.  Patient feels she is between 75 and 80% improved compared to prior.  Patient did purchase a humidifier in her kitchen and air purifier in her bedroom.  Her apartment is a one bedroom.    Patient notes she did not know bronchitis was contagious.    Review of Systems   Respiratory:  Positive for shortness of breath (with ambulation).        Objective     Wt 82.6 kg (182 lb)   SpO2 96%   BMI 27.67 kg/m²      Physical Exam  Constitutional:       Appearance: Normal appearance.   HENT:      Head: Normocephalic and atraumatic.      Right Ear: External ear normal. There is no impacted cerumen.      Left Ear: " External ear normal. There is no impacted cerumen.      Nose: No congestion or rhinorrhea.   Eyes:      Extraocular Movements: Extraocular movements intact.      Conjunctiva/sclera: Conjunctivae normal.      Pupils: Pupils are equal, round, and reactive to light.   Cardiovascular:      Rate and Rhythm: Normal rate and regular rhythm.      Heart sounds: No murmur heard.     No friction rub. No gallop.   Pulmonary:      Effort: No respiratory distress.      Breath sounds: No wheezing, rhonchi or rales.   Skin:     General: Skin is warm and dry.   Neurological:      Mental Status: She is alert.   Psychiatric:         Mood and Affect: Mood normal.         Behavior: Behavior normal.         Assessment/Plan         Unspecified asthma, uncomplicated  ACT is 12.     She recently had an exacerbation due to illness.  She had been diagnosed with bronchitis and required antibiotics and steroids.  She is approximately 80% better at this time.  She will continue the Advair 250/50 twice a day.  Unfortunately, her history is not completely clear and I am unsure if she was using the Advair prior to her illness on a regular basis.  I explained to her that she should continue the Advair twice a day every day.    I would like her to follow-up in 8 weeks and have a pulmonary function test.      By signing my name below, I, Romana Aguirre, attest that this documentation has been prepared under the direction and in the presence of Lori Yeh MD.   All medical record entries made by the Clementeibleeann were at my direction and personally dictated by me. I have reviewed the chart and agree that the record accurately reflects my personal performance of the history, physical exam, discussion and plan.

## 2024-01-16 ENCOUNTER — OFFICE VISIT (OUTPATIENT)
Dept: ALLERGY | Facility: CLINIC | Age: 79
End: 2024-01-16
Payer: MEDICARE

## 2024-01-16 VITALS — BODY MASS INDEX: 27.67 KG/M2 | WEIGHT: 182 LBS | OXYGEN SATURATION: 96 %

## 2024-01-16 DIAGNOSIS — J30.9 ALLERGIC RHINITIS, UNSPECIFIED SEASONALITY, UNSPECIFIED TRIGGER: ICD-10-CM

## 2024-01-16 DIAGNOSIS — J45.909 UNCOMPLICATED ASTHMA, UNSPECIFIED ASTHMA SEVERITY, UNSPECIFIED WHETHER PERSISTENT (HHS-HCC): Primary | ICD-10-CM

## 2024-01-16 PROCEDURE — 1159F MED LIST DOCD IN RCRD: CPT | Performed by: ALLERGY & IMMUNOLOGY

## 2024-01-16 PROCEDURE — 1036F TOBACCO NON-USER: CPT | Performed by: ALLERGY & IMMUNOLOGY

## 2024-01-16 PROCEDURE — 1125F AMNT PAIN NOTED PAIN PRSNT: CPT | Performed by: ALLERGY & IMMUNOLOGY

## 2024-01-16 PROCEDURE — 96160 PT-FOCUSED HLTH RISK ASSMT: CPT | Performed by: ALLERGY & IMMUNOLOGY

## 2024-01-16 PROCEDURE — 99214 OFFICE O/P EST MOD 30 MIN: CPT | Performed by: ALLERGY & IMMUNOLOGY

## 2024-01-16 ASSESSMENT — ENCOUNTER SYMPTOMS: SHORTNESS OF BREATH: 1

## 2024-02-13 ENCOUNTER — OFFICE VISIT (OUTPATIENT)
Dept: PRIMARY CARE | Facility: CLINIC | Age: 79
End: 2024-02-13
Payer: MEDICARE

## 2024-02-13 ENCOUNTER — HOSPITAL ENCOUNTER (OUTPATIENT)
Dept: RADIOLOGY | Facility: CLINIC | Age: 79
Discharge: HOME | End: 2024-02-13
Payer: MEDICARE

## 2024-02-13 VITALS
BODY MASS INDEX: 28.13 KG/M2 | HEART RATE: 63 BPM | WEIGHT: 185 LBS | SYSTOLIC BLOOD PRESSURE: 126 MMHG | DIASTOLIC BLOOD PRESSURE: 70 MMHG | OXYGEN SATURATION: 95 % | TEMPERATURE: 96.6 F

## 2024-02-13 DIAGNOSIS — J40 BRONCHITIS: Primary | ICD-10-CM

## 2024-02-13 DIAGNOSIS — J45.41 MODERATE PERSISTENT ASTHMA WITH EXACERBATION (HHS-HCC): ICD-10-CM

## 2024-02-13 DIAGNOSIS — J45.909 UNCOMPLICATED ASTHMA, UNSPECIFIED ASTHMA SEVERITY, UNSPECIFIED WHETHER PERSISTENT (HHS-HCC): ICD-10-CM

## 2024-02-13 DIAGNOSIS — J40 BRONCHITIS: ICD-10-CM

## 2024-02-13 PROCEDURE — 1159F MED LIST DOCD IN RCRD: CPT | Performed by: FAMILY MEDICINE

## 2024-02-13 PROCEDURE — 99213 OFFICE O/P EST LOW 20 MIN: CPT | Performed by: FAMILY MEDICINE

## 2024-02-13 PROCEDURE — 1125F AMNT PAIN NOTED PAIN PRSNT: CPT | Performed by: FAMILY MEDICINE

## 2024-02-13 PROCEDURE — 71046 X-RAY EXAM CHEST 2 VIEWS: CPT | Performed by: RADIOLOGY

## 2024-02-13 PROCEDURE — 3078F DIAST BP <80 MM HG: CPT | Performed by: FAMILY MEDICINE

## 2024-02-13 PROCEDURE — 1160F RVW MEDS BY RX/DR IN RCRD: CPT | Performed by: FAMILY MEDICINE

## 2024-02-13 PROCEDURE — 3074F SYST BP LT 130 MM HG: CPT | Performed by: FAMILY MEDICINE

## 2024-02-13 PROCEDURE — 1036F TOBACCO NON-USER: CPT | Performed by: FAMILY MEDICINE

## 2024-02-13 PROCEDURE — 71046 X-RAY EXAM CHEST 2 VIEWS: CPT

## 2024-02-13 RX ORDER — PREDNISONE 20 MG/1
60 TABLET ORAL DAILY
Qty: 15 TABLET | Refills: 0 | Status: SHIPPED | OUTPATIENT
Start: 2024-02-13 | End: 2024-02-18

## 2024-02-13 RX ORDER — ALBUTEROL SULFATE 90 UG/1
2 AEROSOL, METERED RESPIRATORY (INHALATION) EVERY 6 HOURS PRN
Qty: 18 G | Refills: 1 | Status: SHIPPED | OUTPATIENT
Start: 2024-02-13

## 2024-02-13 RX ORDER — AMOXICILLIN AND CLAVULANATE POTASSIUM 875; 125 MG/1; MG/1
875 TABLET, FILM COATED ORAL 2 TIMES DAILY
Qty: 20 TABLET | Refills: 0 | Status: SHIPPED | OUTPATIENT
Start: 2024-02-13 | End: 2024-02-23

## 2024-02-13 NOTE — PROGRESS NOTES
Chief complaint:   Chief Complaint   Patient presents with    Cough     1 month not improving     Wheezing     3 weeks       HPI:  Carmen Bauman is a 78 y.o. female who presents for evaluation of a cough which has been present since [place] and is not improving and wheezing which has been present for 3 weeks. She was evaluated in urgent care and completed a course of abx and steroids then followed up 1/3/2024 and had a negative CXR. She last saw her allergist/immunologist: Dr. Yeh 1/16/2024 and was recommended to take her Advair 250/50 BID routinely. She was recommended to do PFT 8 weeks from her appointment. She feels like her sx have not improved and have persisted and since last seen by her allergist, she has worsened.  She has the cough, SOB, and wheezing. Today she reports she has faithfully used her asthma medication since then. She has been using Mucinex regularly as well without benefit. She has tried Nyquil and Dayquil and allergy pills.     No fevers, chills, or night sweats.     Physical exam:  /70   Pulse 63   Temp 35.9 °C (96.6 °F)   Wt 83.9 kg (185 lb)   SpO2 95%   BMI 28.13 kg/m²   General: NAD, well appearing female  Heart: RRR, no mumur appreciated  Lungs: diffuse expiratory wheezing before and after Albuterol treatment (she used her home inhaler 2 puffs in office) without improvement  Abdomen: soft, non tender, normoactive BS, no organomegaly  Extremities: No LE edema    Assessment/Plan   Problem List Items Addressed This Visit       Unspecified asthma, uncomplicated    Relevant Medications    albuterol 90 mcg/actuation inhaler    Other Relevant Orders    Aerochamber Spacer Device     Other Visit Diagnoses       Bronchitis    -  Primary    Relevant Medications    predniSONE (Deltasone) 20 mg tablet    amoxicillin-pot clavulanate (Augmentin) 875-125 mg tablet    Other Relevant Orders    XR chest 2 views    Moderate persistent asthma with exacerbation            Added spacer for  her Albuterol  Continue Advair  CXR ordered  Prednisone and Augmentin ordered (she tolerates this in the past despite allergy listed with nausea SE)  Follow up 3 days, sooner as needed    Ruba Maher, DO

## 2024-02-16 ENCOUNTER — OFFICE VISIT (OUTPATIENT)
Dept: PRIMARY CARE | Facility: CLINIC | Age: 79
End: 2024-02-16
Payer: MEDICARE

## 2024-02-16 VITALS
DIASTOLIC BLOOD PRESSURE: 80 MMHG | WEIGHT: 185 LBS | BODY MASS INDEX: 28.13 KG/M2 | SYSTOLIC BLOOD PRESSURE: 132 MMHG | OXYGEN SATURATION: 98 % | HEART RATE: 62 BPM | TEMPERATURE: 96.9 F

## 2024-02-16 DIAGNOSIS — J45.40 MODERATE PERSISTENT ASTHMA WITHOUT COMPLICATION (HHS-HCC): ICD-10-CM

## 2024-02-16 DIAGNOSIS — J45.901 EXACERBATION OF ASTHMA, UNSPECIFIED ASTHMA SEVERITY, UNSPECIFIED WHETHER PERSISTENT (HHS-HCC): Primary | ICD-10-CM

## 2024-02-16 PROBLEM — G47.00 INSOMNIA: Status: RESOLVED | Noted: 2023-04-11 | Resolved: 2024-02-16

## 2024-02-16 PROBLEM — R52 PAIN, UNSPECIFIED: Status: RESOLVED | Noted: 2022-08-11 | Resolved: 2024-02-16

## 2024-02-16 PROBLEM — I49.3 PVCS (PREMATURE VENTRICULAR CONTRACTIONS): Status: RESOLVED | Noted: 2023-04-11 | Resolved: 2024-02-16

## 2024-02-16 PROBLEM — G89.18 PAIN FOLLOWING ORAL SURGERY: Status: RESOLVED | Noted: 2023-04-11 | Resolved: 2024-02-16

## 2024-02-16 PROBLEM — I47.10 SUPRAVENTRICULAR TACHYCARDIA (CMS-HCC): Status: RESOLVED | Noted: 2019-09-24 | Resolved: 2024-02-16

## 2024-02-16 PROBLEM — R53.83 FATIGUE: Status: RESOLVED | Noted: 2023-04-11 | Resolved: 2024-02-16

## 2024-02-16 PROCEDURE — 3079F DIAST BP 80-89 MM HG: CPT | Performed by: FAMILY MEDICINE

## 2024-02-16 PROCEDURE — 1160F RVW MEDS BY RX/DR IN RCRD: CPT | Performed by: FAMILY MEDICINE

## 2024-02-16 PROCEDURE — 1159F MED LIST DOCD IN RCRD: CPT | Performed by: FAMILY MEDICINE

## 2024-02-16 PROCEDURE — 3075F SYST BP GE 130 - 139MM HG: CPT | Performed by: FAMILY MEDICINE

## 2024-02-16 PROCEDURE — 1036F TOBACCO NON-USER: CPT | Performed by: FAMILY MEDICINE

## 2024-02-16 PROCEDURE — 99214 OFFICE O/P EST MOD 30 MIN: CPT | Performed by: FAMILY MEDICINE

## 2024-02-16 PROCEDURE — 1125F AMNT PAIN NOTED PAIN PRSNT: CPT | Performed by: FAMILY MEDICINE

## 2024-02-16 RX ORDER — FLUTICASONE PROPIONATE AND SALMETEROL 500; 50 UG/1; UG/1
1 POWDER RESPIRATORY (INHALATION)
Qty: 60 EACH | Refills: 11 | Status: SHIPPED | OUTPATIENT
Start: 2024-02-16 | End: 2024-03-15 | Stop reason: SDUPTHER

## 2024-02-16 RX ORDER — BENZONATATE 100 MG/1
100 CAPSULE ORAL 3 TIMES DAILY PRN
Qty: 42 CAPSULE | Refills: 0 | Status: SHIPPED | OUTPATIENT
Start: 2024-02-16 | End: 2024-03-15 | Stop reason: ALTCHOICE

## 2024-02-16 NOTE — PROGRESS NOTES
Chief complaint:   Chief Complaint   Patient presents with    Follow-up     3 day follow up for cough, shortness of breath       HPI:  Carmen Bauman is a 78 y.o. female who presents for evaluation of SOB and wheezing. She continues to have wheezing and SOB though reports improvement. She states she has had coughing fits daily. No fevers or chills. She got her CXR as recommended after last visit. Last use of Albuterol was last night.     Physical exam:  /80   Pulse 62   Temp 36.1 °C (96.9 °F)   Wt 83.9 kg (185 lb)   SpO2 98%   BMI 28.13 kg/m²   General: NAD, well appearing female  Heart: RRR, no mumur appreciated  Lungs: diffuse expiratory wheezing appreciated    Assessment/Plan   Problem List Items Addressed This Visit       Unspecified asthma, uncomplicated     Other Visit Diagnoses       Exacerbation of asthma, unspecified asthma severity, unspecified whether persistent    -  Primary    Relevant Medications    benzonatate (Tessalon) 100 mg capsule    fluticasone propion-salmeteroL (Advair Diskus) 500-50 mcg/dose diskus inhaler        CXR reviewed and no signs of pneumonia  Complete course of Prednisone  Increase Advair dose to 500-50 from 250-50  Benzonatate ordered for PRN use  Albuterol q 4-6 hours while awake for the next 3 days  Follow up 3 days if not significantly improved    Ruba Maher DO

## 2024-02-27 DIAGNOSIS — K21.9 GASTROESOPHAGEAL REFLUX DISEASE WITHOUT ESOPHAGITIS: ICD-10-CM

## 2024-02-27 RX ORDER — OMEPRAZOLE 20 MG/1
20 CAPSULE, DELAYED RELEASE ORAL DAILY
Qty: 90 CAPSULE | Refills: 3 | OUTPATIENT
Start: 2024-02-27

## 2024-02-28 DIAGNOSIS — J45.909 ASTHMA, UNSPECIFIED ASTHMA SEVERITY, UNSPECIFIED WHETHER COMPLICATED, UNSPECIFIED WHETHER PERSISTENT (HHS-HCC): ICD-10-CM

## 2024-02-28 DIAGNOSIS — I10 BENIGN ESSENTIAL HYPERTENSION: ICD-10-CM

## 2024-02-29 DIAGNOSIS — F43.0 ACUTE REACTION TO STRESS: ICD-10-CM

## 2024-02-29 RX ORDER — BUPROPION HYDROCHLORIDE 150 MG/1
150 TABLET ORAL DAILY
Qty: 90 TABLET | Refills: 3 | OUTPATIENT
Start: 2024-02-29

## 2024-02-29 RX ORDER — METOPROLOL SUCCINATE 50 MG/1
50 TABLET, EXTENDED RELEASE ORAL DAILY
Qty: 90 TABLET | Refills: 3 | OUTPATIENT
Start: 2024-02-29

## 2024-02-29 RX ORDER — MONTELUKAST SODIUM 10 MG/1
10 TABLET ORAL NIGHTLY
Qty: 90 TABLET | Refills: 3 | OUTPATIENT
Start: 2024-02-29

## 2024-03-11 ENCOUNTER — APPOINTMENT (OUTPATIENT)
Dept: ALLERGY | Facility: CLINIC | Age: 79
End: 2024-03-11
Payer: MEDICARE

## 2024-03-14 NOTE — PROGRESS NOTES
"Subjective   Patient ID:   [id number]   Carmen Bauman is a 78 y.o. female who presents for Follow-up.    Chief Complaint   Patient presents with    Follow-up      Since last visit, 1-16-24, patient reports improvement in her cough but intermittent with some phlegm expectoration which helps breathing.  If she talks a lot, she will become raspy and will experience SOB mainly with exertion.  She has been compliant with taking her Advair 250/50 BID and montelukast at bedtime.  She was on the 500 for a couple of weeks prior.  She uses albuterol very rarely and does not use it for SOB.  She states she feels good this morning.  She has never had a breathing test or chest CT in the past.      Patient returns for a PFT test.     Patient is S/P 3 body replacements including bilateral TKRs with residual pain since.    Review of Systems   Respiratory:  Positive for cough (intermittent) and shortness of breath (with exertion).      Objective     /65   Ht 1.727 m (5' 8\")   Wt 84.4 kg (186 lb)   BMI 28.28 kg/m²      Physical Exam  Constitutional:       Appearance: Normal appearance.   HENT:      Head: Normocephalic and atraumatic.      Right Ear: External ear normal. There is no impacted cerumen.      Left Ear: External ear normal. There is no impacted cerumen.      Nose: No congestion or rhinorrhea.   Eyes:      Extraocular Movements: Extraocular movements intact.      Conjunctiva/sclera: Conjunctivae normal.      Pupils: Pupils are equal, round, and reactive to light.   Cardiovascular:      Rate and Rhythm: Normal rate and regular rhythm.      Heart sounds: No murmur heard.     No friction rub. No gallop.   Pulmonary:      Effort: No respiratory distress.      Breath sounds: No wheezing, rhonchi or rales.   Skin:     General: Skin is warm and dry.   Neurological:      Mental Status: She is alert.   Psychiatric:         Mood and Affect: Mood normal.         Behavior: Behavior normal.     Pulmonary Functions Testing " Results:    FEV1   Date Value Ref Range Status   03/15/2024 50 liters Final     FVC   Date Value Ref Range Status   03/15/2024 51 liters Final     FEV1/FVC   Date Value Ref Range Status   03/15/2024 99 % Final     Assessment/Plan     Unspecified asthma, uncomplicated  ACT is 15.  Cough is improved but intermittently persists with SOB with exertion.  IO pre and post PFT shows low lung volumes and post bronchodilator, improves 8% consistent with uncontrolled asthma.    Increase Advair to 500 and follow up in 4 weeks to see how this is working for her. I would like to check a PFT and if it continue to show restriction she may require a CT scan of her chest    Continue montelukast at bedtime.    By signing my name below, I, Clemente Aguirreibleeann, attest that this documentation has been prepared under the direction and in the presence of Lori Yeh MD.  All medical record entries made by the Scribe were at my direction and personally dictated by me. I have reviewed the chart and agree that the record accurately reflects my personal performance of the history, physical exam, discussion and plan.

## 2024-03-14 NOTE — ASSESSMENT & PLAN NOTE
ACT is 15.  Cough is improved but intermittently persists with SOB with exertion.  IO pre and post PFT shows low lung volumes and post bronchodilator, improves 8% consistent with uncontrolled asthma.    Increase Advair to 500 and follow up in 4 weeks to see how this is working for her. I would like to check a PFT and if it continue to show restriction she may require a CT scan of her chest    Continue montelukast at bedtime.

## 2024-03-15 ENCOUNTER — OFFICE VISIT (OUTPATIENT)
Dept: ALLERGY | Facility: CLINIC | Age: 79
End: 2024-03-15
Payer: MEDICARE

## 2024-03-15 VITALS
WEIGHT: 186 LBS | BODY MASS INDEX: 28.19 KG/M2 | HEIGHT: 68 IN | DIASTOLIC BLOOD PRESSURE: 65 MMHG | SYSTOLIC BLOOD PRESSURE: 116 MMHG

## 2024-03-15 DIAGNOSIS — J45.901 EXACERBATION OF ASTHMA, UNSPECIFIED ASTHMA SEVERITY, UNSPECIFIED WHETHER PERSISTENT (HHS-HCC): ICD-10-CM

## 2024-03-15 DIAGNOSIS — J45.909 UNCOMPLICATED ASTHMA, UNSPECIFIED ASTHMA SEVERITY, UNSPECIFIED WHETHER PERSISTENT (HHS-HCC): Primary | ICD-10-CM

## 2024-03-15 LAB
FEV1/FVC: 99 %
FEV1: 50 LITERS
FVC: 51 LITERS

## 2024-03-15 PROCEDURE — 3078F DIAST BP <80 MM HG: CPT | Performed by: ALLERGY & IMMUNOLOGY

## 2024-03-15 PROCEDURE — 94060 EVALUATION OF WHEEZING: CPT | Performed by: ALLERGY & IMMUNOLOGY

## 2024-03-15 PROCEDURE — 3074F SYST BP LT 130 MM HG: CPT | Performed by: ALLERGY & IMMUNOLOGY

## 2024-03-15 PROCEDURE — 1160F RVW MEDS BY RX/DR IN RCRD: CPT | Performed by: ALLERGY & IMMUNOLOGY

## 2024-03-15 PROCEDURE — 99214 OFFICE O/P EST MOD 30 MIN: CPT | Performed by: ALLERGY & IMMUNOLOGY

## 2024-03-15 PROCEDURE — 1036F TOBACCO NON-USER: CPT | Performed by: ALLERGY & IMMUNOLOGY

## 2024-03-15 PROCEDURE — 1159F MED LIST DOCD IN RCRD: CPT | Performed by: ALLERGY & IMMUNOLOGY

## 2024-03-15 PROCEDURE — 96160 PT-FOCUSED HLTH RISK ASSMT: CPT | Performed by: ALLERGY & IMMUNOLOGY

## 2024-03-15 RX ORDER — FLUTICASONE PROPIONATE AND SALMETEROL 500; 50 UG/1; UG/1
1 POWDER RESPIRATORY (INHALATION)
Qty: 60 EACH | Refills: 11 | Status: SHIPPED | OUTPATIENT
Start: 2024-03-15 | End: 2025-03-15

## 2024-03-15 ASSESSMENT — ENCOUNTER SYMPTOMS
COUGH: 1
SHORTNESS OF BREATH: 1

## 2024-03-15 NOTE — PATIENT INSTRUCTIONS
Increase Advair to 500 twice a day.    Continue montelukast at bedtime.    Follow up in a month to see how Advair is working and repeat pulmonary function test.  May come in sooner if problems or symptoms worsen prior.

## 2024-04-02 ENCOUNTER — APPOINTMENT (OUTPATIENT)
Dept: INTEGRATIVE MEDICINE | Facility: CLINIC | Age: 79
End: 2024-04-02
Payer: MEDICARE

## 2024-04-02 ENCOUNTER — APPOINTMENT (OUTPATIENT)
Dept: ALLERGY | Facility: CLINIC | Age: 79
End: 2024-04-02
Payer: MEDICARE

## 2024-04-10 ENCOUNTER — APPOINTMENT (OUTPATIENT)
Dept: PRIMARY CARE | Facility: CLINIC | Age: 79
End: 2024-04-10
Payer: MEDICARE

## 2024-04-10 ENCOUNTER — OFFICE VISIT (OUTPATIENT)
Dept: PRIMARY CARE | Facility: CLINIC | Age: 79
End: 2024-04-10
Payer: MEDICARE

## 2024-04-10 VITALS
SYSTOLIC BLOOD PRESSURE: 124 MMHG | DIASTOLIC BLOOD PRESSURE: 74 MMHG | HEIGHT: 68 IN | TEMPERATURE: 96.6 F | BODY MASS INDEX: 28.19 KG/M2 | WEIGHT: 186 LBS

## 2024-04-10 DIAGNOSIS — J45.901 EXACERBATION OF ASTHMA, UNSPECIFIED ASTHMA SEVERITY, UNSPECIFIED WHETHER PERSISTENT (HHS-HCC): ICD-10-CM

## 2024-04-10 DIAGNOSIS — J45.909 UNCOMPLICATED ASTHMA, UNSPECIFIED ASTHMA SEVERITY, UNSPECIFIED WHETHER PERSISTENT (HHS-HCC): ICD-10-CM

## 2024-04-10 DIAGNOSIS — I10 BENIGN ESSENTIAL HYPERTENSION: ICD-10-CM

## 2024-04-10 DIAGNOSIS — K21.9 GASTRO-ESOPHAGEAL REFLUX DISEASE WITHOUT ESOPHAGITIS: ICD-10-CM

## 2024-04-10 DIAGNOSIS — F43.0 ACUTE REACTION TO STRESS: ICD-10-CM

## 2024-04-10 DIAGNOSIS — J45.909 ASTHMA, UNSPECIFIED ASTHMA SEVERITY, UNSPECIFIED WHETHER COMPLICATED, UNSPECIFIED WHETHER PERSISTENT (HHS-HCC): ICD-10-CM

## 2024-04-10 DIAGNOSIS — Z00.00 ROUTINE GENERAL MEDICAL EXAMINATION AT HEALTH CARE FACILITY: Primary | ICD-10-CM

## 2024-04-10 DIAGNOSIS — F41.1 GENERALIZED ANXIETY DISORDER: ICD-10-CM

## 2024-04-10 DIAGNOSIS — J30.9 ALLERGIC RHINITIS, UNSPECIFIED SEASONALITY, UNSPECIFIED TRIGGER: ICD-10-CM

## 2024-04-10 DIAGNOSIS — I10 ESSENTIAL (PRIMARY) HYPERTENSION: ICD-10-CM

## 2024-04-10 DIAGNOSIS — K21.9 GASTROESOPHAGEAL REFLUX DISEASE WITHOUT ESOPHAGITIS: ICD-10-CM

## 2024-04-10 DIAGNOSIS — E78.5 HYPERLIPIDEMIA, UNSPECIFIED HYPERLIPIDEMIA TYPE: ICD-10-CM

## 2024-04-10 DIAGNOSIS — F41.9 ANXIETY DISORDER, UNSPECIFIED TYPE: ICD-10-CM

## 2024-04-10 DIAGNOSIS — J45.40 MODERATE PERSISTENT ASTHMA WITHOUT COMPLICATION (HHS-HCC): ICD-10-CM

## 2024-04-10 DIAGNOSIS — R26.2 DIFFICULTY IN WALKING, NOT ELSEWHERE CLASSIFIED: ICD-10-CM

## 2024-04-10 DIAGNOSIS — F33.41 RECURRENT MAJOR DEPRESSIVE DISORDER, IN PARTIAL REMISSION (CMS-HCC): ICD-10-CM

## 2024-04-10 DIAGNOSIS — M25.551 PAIN OF RIGHT HIP: ICD-10-CM

## 2024-04-10 PROBLEM — F32.A DEPRESSION, UNSPECIFIED: Status: RESOLVED | Noted: 2022-08-11 | Resolved: 2024-04-10

## 2024-04-10 PROBLEM — J45.30 MILD PERSISTENT ASTHMA WITHOUT COMPLICATION (HHS-HCC): Status: ACTIVE | Noted: 2022-08-11

## 2024-04-10 PROBLEM — F32.9 MDD (MAJOR DEPRESSIVE DISORDER): Status: RESOLVED | Noted: 2023-04-11 | Resolved: 2024-04-10

## 2024-04-10 PROCEDURE — 3074F SYST BP LT 130 MM HG: CPT | Performed by: FAMILY MEDICINE

## 2024-04-10 PROCEDURE — 1159F MED LIST DOCD IN RCRD: CPT | Performed by: FAMILY MEDICINE

## 2024-04-10 PROCEDURE — 1170F FXNL STATUS ASSESSED: CPT | Performed by: FAMILY MEDICINE

## 2024-04-10 PROCEDURE — 3078F DIAST BP <80 MM HG: CPT | Performed by: FAMILY MEDICINE

## 2024-04-10 PROCEDURE — 1036F TOBACCO NON-USER: CPT | Performed by: FAMILY MEDICINE

## 2024-04-10 PROCEDURE — 1160F RVW MEDS BY RX/DR IN RCRD: CPT | Performed by: FAMILY MEDICINE

## 2024-04-10 PROCEDURE — 99213 OFFICE O/P EST LOW 20 MIN: CPT | Performed by: FAMILY MEDICINE

## 2024-04-10 PROCEDURE — G0439 PPPS, SUBSEQ VISIT: HCPCS | Performed by: FAMILY MEDICINE

## 2024-04-10 RX ORDER — SERTRALINE HYDROCHLORIDE 50 MG/1
50 TABLET, FILM COATED ORAL DAILY
Qty: 90 TABLET | Refills: 1 | Status: SHIPPED | OUTPATIENT
Start: 2024-04-10 | End: 2024-10-07

## 2024-04-10 RX ORDER — LOSARTAN POTASSIUM AND HYDROCHLOROTHIAZIDE 25; 100 MG/1; MG/1
1 TABLET ORAL DAILY
Qty: 90 TABLET | Refills: 3 | Status: CANCELLED | OUTPATIENT
Start: 2024-04-10 | End: 2025-04-05

## 2024-04-10 RX ORDER — OMEPRAZOLE 20 MG/1
20 TABLET, DELAYED RELEASE ORAL DAILY
Qty: 90 TABLET | Refills: 1 | Status: SHIPPED | OUTPATIENT
Start: 2024-04-10

## 2024-04-10 RX ORDER — ATORVASTATIN CALCIUM 10 MG/1
10 TABLET, FILM COATED ORAL DAILY
Qty: 90 TABLET | Refills: 3 | Status: SHIPPED | OUTPATIENT
Start: 2024-04-10

## 2024-04-10 RX ORDER — ALBUTEROL SULFATE 90 UG/1
2 AEROSOL, METERED RESPIRATORY (INHALATION) EVERY 6 HOURS PRN
Qty: 18 G | Refills: 1 | Status: CANCELLED | OUTPATIENT
Start: 2024-04-10

## 2024-04-10 RX ORDER — MONTELUKAST SODIUM 10 MG/1
10 TABLET ORAL NIGHTLY
Qty: 90 TABLET | Refills: 3 | Status: SHIPPED | OUTPATIENT
Start: 2024-04-10

## 2024-04-10 RX ORDER — FLUTICASONE PROPIONATE AND SALMETEROL 500; 50 UG/1; UG/1
1 POWDER RESPIRATORY (INHALATION)
Qty: 60 EACH | Refills: 11 | Status: CANCELLED | OUTPATIENT
Start: 2024-04-10 | End: 2025-04-10

## 2024-04-10 RX ORDER — BUPROPION HYDROCHLORIDE 150 MG/1
150 TABLET ORAL DAILY
Qty: 90 TABLET | Refills: 1 | Status: SHIPPED | OUTPATIENT
Start: 2024-04-10

## 2024-04-10 RX ORDER — METOPROLOL SUCCINATE 50 MG/1
50 TABLET, EXTENDED RELEASE ORAL DAILY
Qty: 90 TABLET | Refills: 3 | Status: SHIPPED | OUTPATIENT
Start: 2024-04-10

## 2024-04-10 ASSESSMENT — ENCOUNTER SYMPTOMS
ABDOMINAL DISTENTION: 0
PALPITATIONS: 0
DIARRHEA: 0
COUGH: 0
WHEEZING: 1
CONSTIPATION: 0
ACTIVITY CHANGE: 0
SHORTNESS OF BREATH: 1

## 2024-04-10 ASSESSMENT — ACTIVITIES OF DAILY LIVING (ADL)
TAKING_MEDICATION: INDEPENDENT
BATHING: INDEPENDENT
DOING_HOUSEWORK: INDEPENDENT
MANAGING_FINANCES: INDEPENDENT
GROCERY_SHOPPING: INDEPENDENT
DRESSING: INDEPENDENT

## 2024-04-10 NOTE — ASSESSMENT & PLAN NOTE
- Last Dexa 10/13/2023: osteopenia  - Pneumococcal-23 10/10/2013, Pneumococcal conjugate 11/23/2015  - Shingrix vaccine series complete  - last Td 4/6/2018  - up todate with Influenza vaccinations, recommend annual  - recommend COVID-19 boosters when available

## 2024-04-10 NOTE — ASSESSMENT & PLAN NOTE
- currently taking Omeprazole 20 mg PO daily, advised to try every other day  - last colonoscopy and endoscopy 2022

## 2024-04-10 NOTE — PROGRESS NOTES
"Subjective   Reason for Visit: Carmen Bauman is an 78 y.o. female here for a Medicare Wellness visit.     Past Medical, Surgical, and Family History reviewed and updated in chart.    Reviewed all medications by prescribing practitioner or clinical pharmacist (such as prescriptions, OTCs, herbal therapies and supplements) and documented in the medical record.    HPI    She has not had heartburn in a long time but takes Omeprazole daily.    She needs an updated handicap placard. She uses a cane to walk  She has pain in her right hip which is chronic but is constant.     She is following with allergist currently    She lives in independent living and has fears of having to go to assisted living or nursing home. She would like to try counseling for this.     Patient Care Team:  Ruba Maher DO as PCP - General  Ruba Maher DO as PCP - [employer] PCP     Review of Systems   Constitutional:  Negative for activity change.   Respiratory:  Positive for shortness of breath and wheezing. Negative for cough.    Cardiovascular:  Negative for chest pain and palpitations.   Gastrointestinal:  Negative for abdominal distention, constipation and diarrhea.       Objective   Vitals:  /74   Temp 35.9 °C (96.6 °F)   Ht 1.727 m (5' 8\")   Wt 84.4 kg (186 lb)   BMI 28.28 kg/m²       Physical Exam    Assessment/Plan   Problem List Items Addressed This Visit       Anxiety disorder, unspecified    Relevant Medications    sertraline (Zoloft) 50 mg tablet    Other Relevant Orders    Referral to Psychology    Moderate persistent asthma without complication    Overview     On Advair 250/50 with control. 12/2023 exacerbation due to illness.  Required antibiotics and prednisone         Current Assessment & Plan     - not well controlled  - continue Advair 500-50 mcg/dose PO BID and Albuterol PRN         Relevant Medications    montelukast (Singulair) 10 mg tablet    Essential (primary) hypertension    Current " Assessment & Plan     - at goal  - continue Losartan- hydrochlorothiazide 100-25 mg PO daily, metoprolol 50 mg PO daily         Relevant Medications    metoprolol succinate XL (Toprol-XL) 50 mg 24 hr tablet    Gastro-esophageal reflux disease without esophagitis    Current Assessment & Plan     - currently taking Omeprazole 20 mg PO daily, advised to try every other day  - last colonoscopy and endoscopy 2022         Relevant Medications    omeprazole OTC (PriLOSEC OTC) 20 mg EC tablet    Hyperlipidemia, unspecified    Current Assessment & Plan     - continue Atorvastatin 10 mg PO at bedtime          Relevant Medications    atorvastatin (Lipitor) 10 mg tablet    Major depressive disorder, recurrent (CMS/HCC)    Current Assessment & Plan     - adequately controlled         Relevant Medications    sertraline (Zoloft) 50 mg tablet    Other Relevant Orders    Referral to Psychology    Allergic rhinitis    Overview     07-  -  SPT was positive to dust mite.          Current Assessment & Plan     - continue care with Dr. Yeh: Allergy/immunology  - continue Montelukast 10 mg PO at bedtime         Difficulty in walking, not elsewhere classified    Relevant Orders    Disability Placard    Referral to Physical Therapy    XR hip left with pelvis when performed 2 or 3 views    Routine general medical examination at health care facility - Primary    Current Assessment & Plan     - Last Dexa 10/13/2023: osteopenia  - Pneumococcal-23 10/10/2013, Pneumococcal conjugate 11/23/2015  - Shingrix vaccine series complete  - last Td 4/6/2018  - up todate with Influenza vaccinations, recommend annual  - recommend COVID-19 boosters when available         Relevant Orders    1 Year Follow Up In Primary Care - Wellness Exam     Other Visit Diagnoses       Benign essential hypertension        Relevant Medications    metoprolol succinate XL (Toprol-XL) 50 mg 24 hr tablet    Exacerbation of asthma, unspecified asthma severity,  unspecified whether persistent        Asthma, unspecified asthma severity, unspecified whether complicated, unspecified whether persistent        Relevant Medications    montelukast (Singulair) 10 mg tablet    Gastroesophageal reflux disease without esophagitis        Relevant Medications    omeprazole OTC (PriLOSEC OTC) 20 mg EC tablet    Acute reaction to stress        Relevant Medications    buPROPion XL (Wellbutrin XL) 150 mg 24 hr tablet    Pain of right hip            Follow up 6 mo, sooner as needed

## 2024-04-15 PROBLEM — Z91.09 HOUSE DUST MITE ALLERGY: Status: ACTIVE | Noted: 2023-10-09

## 2024-04-16 ENCOUNTER — HOSPITAL ENCOUNTER (OUTPATIENT)
Dept: RADIOLOGY | Facility: CLINIC | Age: 79
Discharge: HOME | End: 2024-04-16
Payer: MEDICARE

## 2024-04-16 ENCOUNTER — APPOINTMENT (OUTPATIENT)
Dept: ALLERGY | Facility: CLINIC | Age: 79
End: 2024-04-16
Payer: MEDICARE

## 2024-04-16 DIAGNOSIS — M25.551 PAIN OF RIGHT HIP: ICD-10-CM

## 2024-04-16 PROCEDURE — 73502 X-RAY EXAM HIP UNI 2-3 VIEWS: CPT | Mod: RT

## 2024-04-16 PROCEDURE — 73502 X-RAY EXAM HIP UNI 2-3 VIEWS: CPT | Mod: RIGHT SIDE | Performed by: RADIOLOGY

## 2024-04-16 NOTE — PROGRESS NOTES
Subjective   Patient ID:   [id number]   Carmen Bauman is a 78 y.o. female who presents for No chief complaint on file..    No chief complaint on file.     Since last visit, 3-15-24, patient reports with the increased Advair to 500, she has done well.  She presents for repeat PFT today.     She continues montelukast at bedtime and uses albuterol very rarely. and does not use it for SOB.    Review of Systems    Objective     There were no vitals taken for this visit.     Physical Exam  Constitutional:       Appearance: Normal appearance.   HENT:      Head: Normocephalic and atraumatic.      Right Ear: External ear normal. There is no impacted cerumen.      Left Ear: External ear normal. There is no impacted cerumen.      Nose: No congestion or rhinorrhea.   Eyes:      Extraocular Movements: Extraocular movements intact.      Conjunctiva/sclera: Conjunctivae normal.      Pupils: Pupils are equal, round, and reactive to light.   Cardiovascular:      Rate and Rhythm: Normal rate and regular rhythm.      Heart sounds: No murmur heard.     No friction rub. No gallop.   Pulmonary:      Effort: No respiratory distress.      Breath sounds: No wheezing, rhonchi or rales.   Skin:     General: Skin is warm and dry.   Neurological:      Mental Status: She is alert.   Psychiatric:         Mood and Affect: Mood normal.         Behavior: Behavior normal.       Assessment/Plan   {Assess/PlanSmartLinks:80556}      No problem-specific Assessment & Plan notes found for this encounter.      By signing my name below, I, Romana Aguirre, attest that this documentation has been prepared under the direction and in the presence of Lori Yeh MD.  All medical record entries made by the Scribe were at my direction and personally dictated by me. I have reviewed the chart and agree that the record accurately reflects my personal performance of the history, physical exam, discussion and plan.

## 2024-04-22 NOTE — PROGRESS NOTES
Subjective   Patient ID:   [id number]   Carmen Bauman is a 78 y.o. female who presents for Asthma (Follow up for PFT, increased Advair to 500).    Chief Complaint   Patient presents with    Asthma     Follow up for PFT, increased Advair to 500      Since last visit, 3-15-24, patient reports she increased the Advair and noticed a slight improvement.  Patient is asking if her 10 lb weight gain could be creating a problem.  Patient had asthma onset in her 20s after hanging out with horses.  She was also allergic to mold, per patient.  Asthma worsened 12-1-21 after she moved.      In November 2022, she rescued 2 rag doll cats but SPT was negative to cat.  Patient believes her Sx are due to her ducts being cleaned and the dust.  Upon waking in the morning, patient will feel wheezy and spit out white phlegm from her lungs.  She complains of shortness of breath with ambulation        Review of Systems   Constitutional:  Positive for unexpected weight change (40 lb unintentional weight gain).   Respiratory:  Positive for cough (in the mornings, productive of white phlegm) and wheezing (Some mornings).      Objective     Pulse 70   Wt 84.4 kg (186 lb)   SpO2 97%   BMI 28.28 kg/m²      Physical Exam  Constitutional:       Appearance: Normal appearance.   HENT:      Head: Normocephalic and atraumatic.      Right Ear: External ear normal. There is no impacted cerumen.      Left Ear: External ear normal. There is no impacted cerumen.      Nose: No congestion or rhinorrhea.   Eyes:      Extraocular Movements: Extraocular movements intact.      Conjunctiva/sclera: Conjunctivae normal.      Pupils: Pupils are equal, round, and reactive to light.   Cardiovascular:      Rate and Rhythm: Normal rate and regular rhythm.      Heart sounds: No murmur heard.     No friction rub. No gallop.   Pulmonary:      Effort: No respiratory distress.      Breath sounds: No wheezing, rhonchi or rales.   Skin:     General: Skin is warm and dry.    Neurological:      Mental Status: She is alert.   Psychiatric:         Mood and Affect: Mood normal.         Behavior: Behavior normal.       Pulmonary Functions Testing Results:    FEV1   Date Value Ref Range Status   04/23/2024 44 liters Final     FVC   Date Value Ref Range Status   04/23/2024 45 liters Final     FEV1/FVC   Date Value Ref Range Status   04/23/2024 99 % Final     Assessment/Plan       Moderate persistent asthma without complication ([facility])  ACT is 16.  I increased her Advair last visit and she noted a slight difference.    IO PFT showed lung volume is low at 45%.  I have no prior PFT to compare.  I am concerned over the low lung volumes.  There is definitely a phenomenon of an overlap syndrome of asthma and COPD.  Nonetheless in this patient's case she did not really have prolonged asthma symptoms.  Her asthma did not become an issue until approximately 4 years ago when she moved.  She is experiencing shortness of breath with ambulation.  Her pulmonary function test does not change significantly postbronchodilator as demonstrated in March 2024.  Therefore, I would like her to undergo a CT scan of her chest to look for any interstitial lung findings.    Continue Advair 500/50 dose 1 inhalation twice a day.  She will continue albuterol as needed            By signing my name below, I, Romana Aguirre, attest that this documentation has been prepared under the direction and in the presence of Lori Yeh MD.  All medical record entries made by the Scribe were at my direction and personally dictated by me. I have reviewed the chart and agree that the record accurately reflects my personal performance of the history, physical exam, discussion and plan.

## 2024-04-23 ENCOUNTER — OFFICE VISIT (OUTPATIENT)
Dept: ALLERGY | Facility: CLINIC | Age: 79
End: 2024-04-23
Payer: MEDICARE

## 2024-04-23 VITALS — HEART RATE: 70 BPM | BODY MASS INDEX: 28.28 KG/M2 | WEIGHT: 186 LBS | OXYGEN SATURATION: 97 %

## 2024-04-23 DIAGNOSIS — J45.40 MODERATE PERSISTENT ASTHMA WITHOUT COMPLICATION (HHS-HCC): Primary | ICD-10-CM

## 2024-04-23 LAB
FEV1/FVC: 99 %
FEV1: 44 LITERS
FVC: 45 LITERS

## 2024-04-23 PROCEDURE — 1160F RVW MEDS BY RX/DR IN RCRD: CPT | Performed by: ALLERGY & IMMUNOLOGY

## 2024-04-23 PROCEDURE — 96160 PT-FOCUSED HLTH RISK ASSMT: CPT | Performed by: ALLERGY & IMMUNOLOGY

## 2024-04-23 PROCEDURE — 1036F TOBACCO NON-USER: CPT | Performed by: ALLERGY & IMMUNOLOGY

## 2024-04-23 PROCEDURE — 94375 RESPIRATORY FLOW VOLUME LOOP: CPT | Performed by: ALLERGY & IMMUNOLOGY

## 2024-04-23 PROCEDURE — 1159F MED LIST DOCD IN RCRD: CPT | Performed by: ALLERGY & IMMUNOLOGY

## 2024-04-23 PROCEDURE — 99214 OFFICE O/P EST MOD 30 MIN: CPT | Performed by: ALLERGY & IMMUNOLOGY

## 2024-04-23 ASSESSMENT — ENCOUNTER SYMPTOMS
WHEEZING: 1
UNEXPECTED WEIGHT CHANGE: 1
COUGH: 1

## 2024-04-23 NOTE — PATIENT INSTRUCTIONS
Continue Advair.    Continue montelukast.    Schedule CT chest.  We will call you with results, recommendations and followup plan.

## 2024-04-23 NOTE — ASSESSMENT & PLAN NOTE
ACT is 16.  I increased her Advair last visit and she noted a slight difference.    IO PFT showed lung volume is low at 45%.  I have no prior PFT to compare.  I am concerned over the low lung volumes.  There is definitely a phenomenon of an overlap syndrome of asthma and COPD.  Nonetheless in this patient's case she did not really have prolonged asthma symptoms.  Her asthma did not become an issue until approximately 4 years ago when she moved.  She is experiencing shortness of breath with ambulation.  Her pulmonary function test does not change significantly postbronchodilator as demonstrated in March 2024.  Therefore, I would like her to undergo a CT scan of her chest to look for any interstitial lung findings.    Continue Advair 500/50 dose 1 inhalation twice a day.  She will continue albuterol as needed

## 2024-05-06 ENCOUNTER — APPOINTMENT (OUTPATIENT)
Dept: RADIOLOGY | Facility: CLINIC | Age: 79
End: 2024-05-06
Payer: MEDICARE

## 2024-05-07 ENCOUNTER — HOSPITAL ENCOUNTER (OUTPATIENT)
Dept: RADIOLOGY | Facility: CLINIC | Age: 79
Discharge: HOME | End: 2024-05-07
Payer: MEDICARE

## 2024-05-07 DIAGNOSIS — J45.40 MODERATE PERSISTENT ASTHMA WITHOUT COMPLICATION (HHS-HCC): ICD-10-CM

## 2024-05-07 PROCEDURE — 71250 CT THORAX DX C-: CPT | Performed by: RADIOLOGY

## 2024-05-07 PROCEDURE — 71250 CT THORAX DX C-: CPT

## 2024-05-14 ENCOUNTER — LAB (OUTPATIENT)
Dept: LAB | Facility: LAB | Age: 79
End: 2024-05-14
Payer: MEDICARE

## 2024-05-14 DIAGNOSIS — I10 ESSENTIAL (PRIMARY) HYPERTENSION: ICD-10-CM

## 2024-05-14 DIAGNOSIS — E55.9 VITAMIN D DEFICIENCY, UNSPECIFIED: ICD-10-CM

## 2024-05-14 DIAGNOSIS — D50.9 IRON DEFICIENCY ANEMIA, UNSPECIFIED IRON DEFICIENCY ANEMIA TYPE: ICD-10-CM

## 2024-05-14 LAB
25(OH)D3 SERPL-MCNC: 66 NG/ML (ref 30–100)
ALBUMIN SERPL BCP-MCNC: 4.5 G/DL (ref 3.4–5)
ALP SERPL-CCNC: 43 U/L (ref 33–136)
ALT SERPL W P-5'-P-CCNC: 27 U/L (ref 7–45)
ANION GAP SERPL CALC-SCNC: 13 MMOL/L (ref 10–20)
AST SERPL W P-5'-P-CCNC: 25 U/L (ref 9–39)
BILIRUB SERPL-MCNC: 0.8 MG/DL (ref 0–1.2)
BUN SERPL-MCNC: 19 MG/DL (ref 6–23)
CALCIUM SERPL-MCNC: 9.6 MG/DL (ref 8.6–10.3)
CHLORIDE SERPL-SCNC: 97 MMOL/L (ref 98–107)
CHOLEST SERPL-MCNC: 194 MG/DL (ref 0–199)
CHOLESTEROL/HDL RATIO: 1.9
CO2 SERPL-SCNC: 27 MMOL/L (ref 21–32)
CREAT SERPL-MCNC: 0.97 MG/DL (ref 0.5–1.05)
EGFRCR SERPLBLD CKD-EPI 2021: 60 ML/MIN/1.73M*2
ERYTHROCYTE [DISTWIDTH] IN BLOOD BY AUTOMATED COUNT: 12.4 % (ref 11.5–14.5)
FERRITIN SERPL-MCNC: 43 NG/ML (ref 8–150)
GLUCOSE SERPL-MCNC: 94 MG/DL (ref 74–99)
HCT VFR BLD AUTO: 40.5 % (ref 36–46)
HDLC SERPL-MCNC: 103.7 MG/DL
HGB BLD-MCNC: 14.1 G/DL (ref 12–16)
IRON SATN MFR SERPL: 33 % (ref 25–45)
IRON SERPL-MCNC: 132 UG/DL (ref 35–150)
LDLC SERPL CALC-MCNC: 79 MG/DL
MCH RBC QN AUTO: 32.4 PG (ref 26–34)
MCHC RBC AUTO-ENTMCNC: 34.8 G/DL (ref 32–36)
MCV RBC AUTO: 93 FL (ref 80–100)
NON HDL CHOLESTEROL: 90 MG/DL (ref 0–149)
NRBC BLD-RTO: 0 /100 WBCS (ref 0–0)
PLATELET # BLD AUTO: 286 X10*3/UL (ref 150–450)
POTASSIUM SERPL-SCNC: 3.6 MMOL/L (ref 3.5–5.3)
PROT SERPL-MCNC: 6.7 G/DL (ref 6.4–8.2)
RBC # BLD AUTO: 4.35 X10*6/UL (ref 4–5.2)
SODIUM SERPL-SCNC: 133 MMOL/L (ref 136–145)
TIBC SERPL-MCNC: 403 UG/DL (ref 240–445)
TRIGL SERPL-MCNC: 55 MG/DL (ref 0–149)
UIBC SERPL-MCNC: 271 UG/DL (ref 110–370)
VLDL: 11 MG/DL (ref 0–40)
WBC # BLD AUTO: 5.7 X10*3/UL (ref 4.4–11.3)

## 2024-05-14 PROCEDURE — 82728 ASSAY OF FERRITIN: CPT

## 2024-05-14 PROCEDURE — 36415 COLL VENOUS BLD VENIPUNCTURE: CPT

## 2024-05-14 PROCEDURE — 83540 ASSAY OF IRON: CPT

## 2024-05-14 PROCEDURE — 83550 IRON BINDING TEST: CPT

## 2024-05-14 PROCEDURE — 80061 LIPID PANEL: CPT

## 2024-05-14 PROCEDURE — 80053 COMPREHEN METABOLIC PANEL: CPT

## 2024-05-14 PROCEDURE — 82306 VITAMIN D 25 HYDROXY: CPT

## 2024-05-14 PROCEDURE — 85027 COMPLETE CBC AUTOMATED: CPT

## 2024-05-15 DIAGNOSIS — I10 ESSENTIAL (PRIMARY) HYPERTENSION: Primary | ICD-10-CM

## 2024-05-15 RX ORDER — LOSARTAN POTASSIUM 100 MG/1
100 TABLET ORAL DAILY
Qty: 100 TABLET | Refills: 3 | Status: SHIPPED | OUTPATIENT
Start: 2024-05-15 | End: 2024-05-17 | Stop reason: SDUPTHER

## 2024-05-15 RX ORDER — AMLODIPINE BESYLATE 5 MG/1
5 TABLET ORAL DAILY
Qty: 90 TABLET | Refills: 0 | Status: SHIPPED | OUTPATIENT
Start: 2024-05-15 | End: 2024-05-17 | Stop reason: SDUPTHER

## 2024-05-16 ENCOUNTER — HOSPITAL ENCOUNTER (OUTPATIENT)
Dept: RADIOLOGY | Facility: HOSPITAL | Age: 79
Discharge: HOME | End: 2024-05-16
Payer: MEDICARE

## 2024-05-16 DIAGNOSIS — E27.8 ADRENAL NODULE (MULTI): ICD-10-CM

## 2024-05-16 PROCEDURE — 2550000001 HC RX 255 CONTRASTS: Performed by: FAMILY MEDICINE

## 2024-05-16 PROCEDURE — 74178 CT ABD&PLV WO CNTR FLWD CNTR: CPT | Performed by: RADIOLOGY

## 2024-05-16 PROCEDURE — 74170 CT ABD WO CNTRST FLWD CNTRST: CPT

## 2024-05-16 RX ADMIN — IOHEXOL 75 ML: 350 INJECTION, SOLUTION INTRAVENOUS at 17:13

## 2024-05-17 ENCOUNTER — TELEPHONE (OUTPATIENT)
Dept: PRIMARY CARE | Facility: CLINIC | Age: 79
End: 2024-05-17
Payer: MEDICARE

## 2024-05-17 DIAGNOSIS — I10 BENIGN ESSENTIAL HYPERTENSION: ICD-10-CM

## 2024-05-17 DIAGNOSIS — I10 ESSENTIAL (PRIMARY) HYPERTENSION: ICD-10-CM

## 2024-05-17 RX ORDER — LOSARTAN POTASSIUM 100 MG/1
100 TABLET ORAL DAILY
Qty: 100 TABLET | Refills: 3 | Status: SHIPPED | OUTPATIENT
Start: 2024-05-17 | End: 2025-06-21

## 2024-05-17 RX ORDER — AMLODIPINE BESYLATE 5 MG/1
5 TABLET ORAL DAILY
Qty: 90 TABLET | Refills: 0 | Status: SHIPPED | OUTPATIENT
Start: 2024-05-17 | End: 2024-08-15

## 2024-05-17 RX ORDER — LOSARTAN POTASSIUM AND HYDROCHLOROTHIAZIDE 25; 100 MG/1; MG/1
1 TABLET ORAL DAILY
Qty: 30 TABLET | Refills: 0 | Status: SHIPPED | OUTPATIENT
Start: 2024-05-17 | End: 2024-05-18

## 2024-05-17 NOTE — TELEPHONE ENCOUNTER
Can you please cx the script for losartan /hydrochlorothiazide that was sent today to [facility] and please resend:     1) Losartan 100mg daily    2) Amlodipine 5mg daily    To [facility], Dr. CAMPBELL change her meds on 05/15/24 and sent them to the mail order instead of the local pharm.     Pt would like to be notified once these are sent, Thank you

## 2024-05-27 DIAGNOSIS — R16.0 LIVER MASS: Primary | ICD-10-CM

## 2024-05-31 ENCOUNTER — PATIENT MESSAGE (OUTPATIENT)
Dept: PRIMARY CARE | Facility: CLINIC | Age: 79
End: 2024-05-31
Payer: MEDICARE

## 2024-06-17 ENCOUNTER — APPOINTMENT (OUTPATIENT)
Dept: CARDIOLOGY | Facility: HOSPITAL | Age: 79
End: 2024-06-17
Payer: MEDICARE

## 2024-06-17 ENCOUNTER — HOSPITAL ENCOUNTER (EMERGENCY)
Facility: HOSPITAL | Age: 79
Discharge: HOME | End: 2024-06-17
Attending: EMERGENCY MEDICINE
Payer: MEDICARE

## 2024-06-17 ENCOUNTER — TELEPHONE (OUTPATIENT)
Dept: PRIMARY CARE | Facility: CLINIC | Age: 79
End: 2024-06-17

## 2024-06-17 VITALS
WEIGHT: 187 LBS | HEIGHT: 68 IN | OXYGEN SATURATION: 100 % | DIASTOLIC BLOOD PRESSURE: 87 MMHG | RESPIRATION RATE: 19 BRPM | HEART RATE: 75 BPM | BODY MASS INDEX: 28.34 KG/M2 | SYSTOLIC BLOOD PRESSURE: 140 MMHG | TEMPERATURE: 97.7 F

## 2024-06-17 DIAGNOSIS — M79.605 PAIN IN LEFT LEG: ICD-10-CM

## 2024-06-17 DIAGNOSIS — L03.116 LEFT LEG CELLULITIS: Primary | ICD-10-CM

## 2024-06-17 LAB
ALBUMIN SERPL BCP-MCNC: 4.3 G/DL (ref 3.4–5)
ALP SERPL-CCNC: 37 U/L (ref 33–136)
ALT SERPL W P-5'-P-CCNC: 23 U/L (ref 7–45)
ANION GAP SERPL CALC-SCNC: 13 MMOL/L (ref 10–20)
AST SERPL W P-5'-P-CCNC: 23 U/L (ref 9–39)
BASOPHILS # BLD AUTO: 0.07 X10*3/UL (ref 0–0.1)
BASOPHILS NFR BLD AUTO: 1.3 %
BILIRUB SERPL-MCNC: 0.5 MG/DL (ref 0–1.2)
BUN SERPL-MCNC: 18 MG/DL (ref 6–23)
CALCIUM SERPL-MCNC: 9.4 MG/DL (ref 8.6–10.3)
CHLORIDE SERPL-SCNC: 103 MMOL/L (ref 98–107)
CO2 SERPL-SCNC: 26 MMOL/L (ref 21–32)
CREAT SERPL-MCNC: 0.8 MG/DL (ref 0.5–1.05)
EGFRCR SERPLBLD CKD-EPI 2021: 76 ML/MIN/1.73M*2
EOSINOPHIL # BLD AUTO: 0.25 X10*3/UL (ref 0–0.4)
EOSINOPHIL NFR BLD AUTO: 4.7 %
ERYTHROCYTE [DISTWIDTH] IN BLOOD BY AUTOMATED COUNT: 13.1 % (ref 11.5–14.5)
GLUCOSE SERPL-MCNC: 91 MG/DL (ref 74–99)
HCT VFR BLD AUTO: 39.3 % (ref 36–46)
HGB BLD-MCNC: 13.1 G/DL (ref 12–16)
IMM GRANULOCYTES # BLD AUTO: 0.01 X10*3/UL (ref 0–0.5)
IMM GRANULOCYTES NFR BLD AUTO: 0.2 % (ref 0–0.9)
LYMPHOCYTES # BLD AUTO: 1.55 X10*3/UL (ref 0.8–3)
LYMPHOCYTES NFR BLD AUTO: 29.4 %
MCH RBC QN AUTO: 32.4 PG (ref 26–34)
MCHC RBC AUTO-ENTMCNC: 33.3 G/DL (ref 32–36)
MCV RBC AUTO: 97 FL (ref 80–100)
MONOCYTES # BLD AUTO: 0.4 X10*3/UL (ref 0.05–0.8)
MONOCYTES NFR BLD AUTO: 7.6 %
NEUTROPHILS # BLD AUTO: 3 X10*3/UL (ref 1.6–5.5)
NEUTROPHILS NFR BLD AUTO: 56.8 %
NRBC BLD-RTO: 0 /100 WBCS (ref 0–0)
PLATELET # BLD AUTO: 224 X10*3/UL (ref 150–450)
POTASSIUM SERPL-SCNC: 3.8 MMOL/L (ref 3.5–5.3)
PROT SERPL-MCNC: 6.9 G/DL (ref 6.4–8.2)
RBC # BLD AUTO: 4.04 X10*6/UL (ref 4–5.2)
SODIUM SERPL-SCNC: 138 MMOL/L (ref 136–145)
WBC # BLD AUTO: 5.3 X10*3/UL (ref 4.4–11.3)

## 2024-06-17 PROCEDURE — 93971 EXTREMITY STUDY: CPT | Performed by: INTERNAL MEDICINE

## 2024-06-17 PROCEDURE — 85025 COMPLETE CBC W/AUTO DIFF WBC: CPT

## 2024-06-17 PROCEDURE — 99284 EMERGENCY DEPT VISIT MOD MDM: CPT | Mod: 25

## 2024-06-17 PROCEDURE — 36415 COLL VENOUS BLD VENIPUNCTURE: CPT

## 2024-06-17 PROCEDURE — 93971 EXTREMITY STUDY: CPT

## 2024-06-17 PROCEDURE — 2500000001 HC RX 250 WO HCPCS SELF ADMINISTERED DRUGS (ALT 637 FOR MEDICARE OP)

## 2024-06-17 PROCEDURE — 84520 ASSAY OF UREA NITROGEN: CPT

## 2024-06-17 RX ORDER — KETOROLAC TROMETHAMINE 15 MG/ML
15 INJECTION, SOLUTION INTRAMUSCULAR; INTRAVENOUS ONCE
Status: DISCONTINUED | OUTPATIENT
Start: 2024-06-17 | End: 2024-06-17

## 2024-06-17 RX ORDER — DOXYCYCLINE 100 MG/1
100 CAPSULE ORAL 2 TIMES DAILY
Qty: 20 CAPSULE | Refills: 0 | Status: SHIPPED | OUTPATIENT
Start: 2024-06-17 | End: 2024-06-27

## 2024-06-17 RX ORDER — AMOXICILLIN AND CLAVULANATE POTASSIUM 875; 125 MG/1; MG/1
1 TABLET, FILM COATED ORAL 2 TIMES DAILY
Qty: 20 TABLET | Refills: 0 | Status: SHIPPED | OUTPATIENT
Start: 2024-06-17 | End: 2024-06-27

## 2024-06-17 RX ORDER — TRAMADOL HYDROCHLORIDE 50 MG/1
50 TABLET ORAL ONCE
Status: COMPLETED | OUTPATIENT
Start: 2024-06-17 | End: 2024-06-17

## 2024-06-17 ASSESSMENT — LIFESTYLE VARIABLES
HAVE PEOPLE ANNOYED YOU BY CRITICIZING YOUR DRINKING: NO
EVER FELT BAD OR GUILTY ABOUT YOUR DRINKING: NO
TOTAL SCORE: 0
HAVE YOU EVER FELT YOU SHOULD CUT DOWN ON YOUR DRINKING: NO
EVER HAD A DRINK FIRST THING IN THE MORNING TO STEADY YOUR NERVES TO GET RID OF A HANGOVER: NO

## 2024-06-17 ASSESSMENT — PAIN DESCRIPTION - DESCRIPTORS: DESCRIPTORS: THROBBING

## 2024-06-17 ASSESSMENT — COLUMBIA-SUICIDE SEVERITY RATING SCALE - C-SSRS
2. HAVE YOU ACTUALLY HAD ANY THOUGHTS OF KILLING YOURSELF?: NO
1. IN THE PAST MONTH, HAVE YOU WISHED YOU WERE DEAD OR WISHED YOU COULD GO TO SLEEP AND NOT WAKE UP?: NO
6. HAVE YOU EVER DONE ANYTHING, STARTED TO DO ANYTHING, OR PREPARED TO DO ANYTHING TO END YOUR LIFE?: NO

## 2024-06-17 ASSESSMENT — PAIN SCALES - GENERAL
PAINLEVEL_OUTOF10: 0 - NO PAIN
PAINLEVEL_OUTOF10: 7

## 2024-06-17 ASSESSMENT — PAIN DESCRIPTION - LOCATION: LOCATION: LEG

## 2024-06-17 ASSESSMENT — PAIN DESCRIPTION - ORIENTATION: ORIENTATION: LEFT;LOWER

## 2024-06-17 ASSESSMENT — PAIN - FUNCTIONAL ASSESSMENT
PAIN_FUNCTIONAL_ASSESSMENT: 0-10
PAIN_FUNCTIONAL_ASSESSMENT: 0-10

## 2024-06-17 ASSESSMENT — PAIN DESCRIPTION - FREQUENCY: FREQUENCY: CONSTANT/CONTINUOUS

## 2024-06-17 ASSESSMENT — PAIN DESCRIPTION - PAIN TYPE: TYPE: ACUTE PAIN

## 2024-06-17 NOTE — TELEPHONE ENCOUNTER
You have no 20 minute spots available, even at the 10 and 3 hours. Where would you like her added?

## 2024-06-17 NOTE — DISCHARGE INSTRUCTIONS
Seek immediate medical attention if your leg  develops: increasing redness, increasing swelling, increasing warmth to touch, discharge or drainage, increasing pain, or any new or worsening symptoms.

## 2024-06-17 NOTE — ED PROVIDER NOTES
EMERGENCY DEPARTMENT ENCOUNTER      Pt Name: Carmen Bauman  MRN: [id number]  Birthdate 1945  Date of evaluation: 6/17/2024  Provider: Apryl Duarte DO    CHIEF COMPLAINT       Chief Complaint   Patient presents with    Leg Swelling     Pt with left lower leg swelling. Pt was sent by Dr. Bates for ultrasound of lower left extremity for rule out dvt.          HISTORY OF PRESENT ILLNESS    HPI    Carmen Bauman 78-year-old female presenting with left leg swelling.  Medical history is significant for CKD stage III, h/o back surgery with lower left extremity numbness and tingling.  On Thursday of last week she reports new onset of pain and swelling of her left lower extremity which is gradually worsened.  She reached out to Dr. Bates who is a vascular surgeon that investigated her numbness and tingling after her surgery however this provider was unable to see her until Wednesday.  She reports the pain as throbbing but states her numbness is at baseline.  States she had a tooth pulled 2 weeks ago and is scheduled to get the stitches out later today.  Her primary care physician put her on amlodipine at the end of May and changed her dosing of losartan.      Nursing Notes were reviewed.    PAST MEDICAL HISTORY     Past Medical History:   Diagnosis Date    Acute bronchitis 01/03/2024    Allergy status to unspecified drugs, medicaments and biological substances     History of seasonal allergies    Anemia 01/03/2024    Chronic kidney disease, stage 3 unspecified ([facility]) 08/02/2019    CKD (chronic kidney disease) stage 3, GFR 30-59 ml/min    Combined forms of age-related cataract, bilateral 02/10/2020    Combined form of age-related cataract, both eyes    Combined forms of age-related cataract, right eye 09/14/2020    Combined form of age-related cataract, right eye    Dysuria 01/03/2024    Edema 01/03/2024    Encounter for other preprocedural examination 07/06/2018    Preoperative clearance    Encounter for  screening for malignant neoplasm, site unspecified     Encounter for Pap smear    Essential (primary) hypertension 07/06/2018    Benign essential hypertension    Fatigue 04/11/2023    Hemangioma of skin and subcutaneous tissue 05/27/2021    Hyperlipidemia, unspecified 08/19/2022    Hyperlipidemia    Insomnia 04/11/2023    Osteoarthritis of hip, unspecified 10/13/2021    Degenerative joint disease (DJD) of hip    Other melanin hyperpigmentation 05/27/2021    Other seborrheic keratosis 05/27/2021    Pain following oral surgery 04/11/2023    Pain in unspecified joint 12/18/2019    Polyarthralgia    Pain in unspecified knee 05/23/2016    Joint pain, knee    Pain in unspecified wrist     Wrist pain    Pain in wrist 01/03/2024    Comment on above: noted as right wrist with no other description;    Pain, unspecified 08/11/2022    Personal history of other diseases of the circulatory system 12/11/2020    History of supraventricular tachycardia    Personal history of other diseases of the respiratory system 03/15/2022    History of acute bronchitis    Personal history of other medical treatment     H/O mammogram    Personal history of other specified conditions 07/06/2018    History of palpitations    Personal history of other specified conditions 08/09/2018    History of palpitations    PVCs (premature ventricular contractions) 04/11/2023    Rash and other nonspecific skin eruption 05/27/2021    Superficial foreign body, left foot, initial encounter 07/02/2020    Foreign body in left foot, initial encounter    Superficial foreign body, left foot, initial encounter 07/02/2020    Foreign body in left foot, initial encounter    Supraventricular tachycardia ([facility]) 09/24/2019    Unspecified asthma, uncomplicated ([facility]) 12/17/2021    Asthma    Unspecified asthma, uncomplicated ([facility]) 01/27/2020    AB (asthmatic bronchitis)    Urinary tract infection, site not specified 07/31/2022    Acute UTI (urinary tract infection)     Viral upper respiratory tract infection 01/03/2024         SURGICAL HISTORY       Past Surgical History:   Procedure Laterality Date    COLONOSCOPY  12/07/2020    Colonoscopy (Fiberoptic)    DILATION AND CURETTAGE OF UTERUS  03/28/2014    Dilation And Curettage    OTHER SURGICAL HISTORY  03/28/2014    Hysteroscopy    OTHER SURGICAL HISTORY  08/19/2022    Knee replacement         CURRENT MEDICATIONS       Previous Medications    ALBUTEROL 90 MCG/ACTUATION INHALER    Inhale 2 puffs every 6 hours if needed for wheezing or shortness of breath.    AMLODIPINE (NORVASC) 5 MG TABLET    Take 1 tablet (5 mg) by mouth once daily.    ASPIRIN 81 MG EC TABLET    Take 1 tablet (81 mg) by mouth once daily.    ATORVASTATIN (LIPITOR) 10 MG TABLET    Take 1 tablet (10 mg) by mouth once daily.    B COMPLEX 0.4 MG TABLET    Take 1 tablet by mouth once daily.    BUPROPION XL (WELLBUTRIN XL) 150 MG 24 HR TABLET    Take 1 tablet (150 mg) by mouth once daily. Do not crush, chew, or split.    BUSPIRONE (BUSPAR) 15 MG TABLET    Take 1 tablet (15 mg) by mouth 2 times a day as needed (anxiety).    CHOLECALCIFEROL (VITAMIN D-3) 25 MCG (1000 UT) CAPSULE    Take 1 capsule (25 mcg) by mouth once daily.    CYANOCOBALAMIN, VITAMIN B-12, (VITAMIN B-12 ORAL)    Take 1 tablet by mouth once daily.    FLUTICASONE PROPION-SALMETEROL (ADVAIR DISKUS) 500-50 MCG/DOSE DISKUS INHALER    Inhale 1 puff 2 times a day. Rinse mouth with water after use to reduce aftertaste and incidence of candidiasis. Do not swallow.    KRILL  MG CAPSULE    Take by mouth.    LOSARTAN (COZAAR) 100 MG TABLET    Take 1 tablet (100 mg) by mouth once daily.    MAGNESIUM OXIDE (MAG-OX) 400 MG (241.3 MG MAGNESIUM) TABLET    Take 1 tablet (400 mg) by mouth once daily.    METOPROLOL SUCCINATE XL (TOPROL-XL) 50 MG 24 HR TABLET    Take 1 tablet (50 mg) by mouth once daily.    MONTELUKAST (SINGULAIR) 10 MG TABLET    Take 1 tablet (10 mg) by mouth once daily at bedtime.    OMEPRAZOLE OTC  (PRILOSEC OTC) 20 MG EC TABLET    Take 1 tablet (20 mg) by mouth once daily.    SERTRALINE (ZOLOFT) 50 MG TABLET    Take 1 tablet (50 mg) by mouth once daily.    TRAMADOL (ULTRAM) 50 MG TABLET    Take 1 tablet (50 mg) by mouth every 6 hours if needed for severe pain (7 - 10).    TRAZODONE (DESYREL) 50 MG TABLET    Take 0.5 tablets (25 mg) by mouth once daily at bedtime.       ALLERGIES     House dust, Mold, Pollen extracts, Clarithromycin, and Clavulanic acid    FAMILY HISTORY       Family History   Problem Relation Name Age of Onset    Hypertension Mother      Osteoarthritis Mother      Hypertension Father      Other (malignant neoplasm of urinary bladder) Father      Other (cerebral) Maternal Grandmother      COPD Paternal Grandmother            SOCIAL HISTORY       Social History     Socioeconomic History    Marital status: Single     Spouse name: Not on file    Number of children: Not on file    Years of education: Not on file    Highest education level: Not on file   Occupational History    Not on file   Tobacco Use    Smoking status: Former     Current packs/day: 0.25     Average packs/day: 0.3 packs/day for 5.0 years (1.3 ttl pk-yrs)     Types: Cigarettes     Passive exposure: Never    Smokeless tobacco: Never   Substance and Sexual Activity    Alcohol use: Yes    Drug use: Never    Sexual activity: Not on file   Other Topics Concern    Not on file   Social History Narrative    Not on file     Social Determinants of Health     Financial Resource Strain: Not on File (1/29/2021)    Received from [employer]     Financial Resource Strain     Financial Resource Strain: 0   Food Insecurity: Not on File (1/29/2021)    Received from [employer]     Food Insecurity     Food: 0   Transportation Needs: Not on File (1/29/2021)    Received from [employer]     Transportation Needs     Transportation: 0   Physical Activity: Not on File (1/29/2021)    Received from [employer]     Physical Activity     Physical Activity: 0   Stress: Not on File  (2021)    Received from [facility]     Stress     Stress: 0   Social Connections: Not on File (2021)    Received from [facility]     Social Connections and Isolation: 0   Intimate Partner Violence: Not on file   Housing Stability: Not on File (2021)    Received from [employer]     Housing Stability     Housin       SCREENINGS                        PHYSICAL EXAM    (up to 7 for level 4, 8 or more for level 5)     ED Triage Vitals [24 1111]   Temperature Heart Rate Respirations BP   36.5 °C (97.7 °F) 80 15 129/70      Pulse Ox Temp Source Heart Rate Source Patient Position   97 % Tympanic Monitor Sitting      BP Location FiO2 (%)     Right arm --       Physical Exam  Constitutional:       General: She is not in acute distress.     Appearance: She is normal weight.   HENT:      Mouth/Throat:      Mouth: Mucous membranes are moist.      Pharynx: Oropharynx is clear.   Eyes:      Conjunctiva/sclera: Conjunctivae normal.   Cardiovascular:      Rate and Rhythm: Normal rate and regular rhythm.   Abdominal:      General: Abdomen is flat. Bowel sounds are normal. There is no distension.      Palpations: Abdomen is soft.   Musculoskeletal:      Right lower leg: Normal.      Left lower leg: Swelling and tenderness present.      Left ankle: Swelling present. Tenderness present.      Left foot: Swelling and tenderness present.      Comments: Surgical scars pregnant on bilateral knees; erythema seen from below knee down on left lower extremity   Skin:     General: Skin is warm and dry.   Neurological:      Mental Status: She is alert.   Psychiatric:         Mood and Affect: Mood normal.         Behavior: Behavior normal.         Thought Content: Thought content normal.         Judgment: Judgment normal.          DIAGNOSTIC RESULTS     LABS:  Labs Reviewed - No data to display    All other labs were within normal range or not returned as of this dictation.    Imaging  No orders to display     "    Procedures  Procedures     EMERGENCY DEPARTMENT COURSE/MDM:     Diagnoses as of 06/17/24 1353   Left leg cellulitis        Medical Decision Making    Carmen Bauman is a 78-year-old female presenting with left lower leg swelling.  Upon presentation to the ED blood pressure was 129/70, heart rate 80, afebrile, saturating appropriately on room air.  On physical exam, her left lower extremity from the knee down is more swollen than the right and erythematous, warm, tender to touch.  Pulses were confirmed via Doppler.  Sensation and strength diminished at baseline due to back surgery.  Heart, lung, and abdominal exam unremarkable.  A duplex of the left lower extremity was ordered to rule out DVT and no clot was found.  Considering there was no thrombus seen, presentation is more likely cellulitis.  Was given her home dose of tramadol for pain management since she had not taken it today.  Patient was prescribed doxycycline and Augmentin.  Chart had a flag for clavulanate acid allergy.  This was discussed with the patient she stated she had a rash at 1 time but could not recall having any future reactions.  She is agreeable to having the medication and stated \"I don't think is a real allergy but it is in my chart.\"  Augmentin was chosen since the patient has a dental appointment later today and was prescribed amoxicillin to take prior to the surgery.  She was instructed to follow-up with her primary care physician and to keep her vascular surgeon appointment later this week.  She was discharged in stable condition.    Patient and or family in agreement and understanding of treatment plan.  All questions answered.      I reviewed the case with the attending ED physician. The attending ED physician agrees with the plan. Patient and/or patient´s representative was counseled regarding labs, imaging, likely diagnosis, and plan. All questions were answered.    ED Medications administered this visit:  Medications - No data " to display    New Prescriptions from this visit:    New Prescriptions    No medications on file       Follow-up:  No follow-up provider specified.      Final Impression: No diagnosis found.      (Please note that portions of this note were completed with a voice recognition program.  Efforts were made to edit the dictations but occasionally words are mis-transcribed.)     Apryl Duarte DO  Resident  06/17/24 1352       Apryl Duarte,   Resident  06/17/24 1354       Apryl Duarte,   Resident  06/17/24 1425

## 2024-06-18 ENCOUNTER — OFFICE VISIT (OUTPATIENT)
Dept: PRIMARY CARE | Facility: CLINIC | Age: 79
End: 2024-06-18
Payer: MEDICARE

## 2024-06-18 VITALS
BODY MASS INDEX: 28.43 KG/M2 | TEMPERATURE: 98.3 F | DIASTOLIC BLOOD PRESSURE: 82 MMHG | SYSTOLIC BLOOD PRESSURE: 174 MMHG | WEIGHT: 187 LBS

## 2024-06-18 DIAGNOSIS — L03.116 CELLULITIS OF LEFT LOWER EXTREMITY: Primary | ICD-10-CM

## 2024-06-18 PROCEDURE — 1159F MED LIST DOCD IN RCRD: CPT | Performed by: FAMILY MEDICINE

## 2024-06-18 PROCEDURE — 1123F ACP DISCUSS/DSCN MKR DOCD: CPT | Performed by: FAMILY MEDICINE

## 2024-06-18 PROCEDURE — 3079F DIAST BP 80-89 MM HG: CPT | Performed by: FAMILY MEDICINE

## 2024-06-18 PROCEDURE — 3077F SYST BP >= 140 MM HG: CPT | Performed by: FAMILY MEDICINE

## 2024-06-18 PROCEDURE — 99213 OFFICE O/P EST LOW 20 MIN: CPT | Performed by: FAMILY MEDICINE

## 2024-06-18 NOTE — PROGRESS NOTES
Chief complaint:   Chief Complaint   Patient presents with    hospital discharge      Pt is vomiting from antib       HPI:  Carmen Bauman is a 78 y.o. female who presents for evaluation of possible cellulitis. She has had swelling and pain in her left calf since back surgery years ago (2020 or 2021) but had acute worsening of redness, pain, and swelling and went to ED 6/17/2024.     She is seeing Dr. Bates tomorrow (vascular surgeon) for evaluation increase in pain and left LE swelling. Her venous duplex was done at the ED 6/17/2024 and negative for clot. She was prescribed abx for possible cellulitis. She was prescribed Doxycycline and Augmentin. She has started vomiting after starting the abx.     Physical exam:  /82 (BP Location: Right arm)   Temp 36.8 °C (98.3 °F)   Wt 84.8 kg (187 lb)   BMI 28.43 kg/m²   General: NAD, well appearing female  Heart: RRR, no mumur appreciated  Lungs: CTAB, no wheezes, rales, rhonchi  Abdomen: soft, non tender, normoactive BS, no organomegaly  Extremities: bilateral swelling which is pitting of the lower legs > on the left then the right without significant erythema.     Assessment/Plan   Problem List Items Addressed This Visit    None  Visit Diagnoses       Cellulitis of left lower extremity    -  Primary        Stop doxcycline and continue Augmetin for total of 7 days  Keep appointment with vascular surgery this week (Dr. Bates)  Will need to monitor BP as it is not at goal but she was vomiting prior to her visit with me and will not make adjustment currently. Monitor at home and call in a couple weeks with home BP    Ruba Maher DO

## 2024-07-05 ENCOUNTER — OFFICE VISIT (OUTPATIENT)
Dept: PRIMARY CARE | Facility: CLINIC | Age: 79
End: 2024-07-05
Payer: MEDICARE

## 2024-07-05 ENCOUNTER — APPOINTMENT (OUTPATIENT)
Dept: PRIMARY CARE | Facility: CLINIC | Age: 79
End: 2024-07-05
Payer: MEDICARE

## 2024-07-05 VITALS — TEMPERATURE: 97.7 F | DIASTOLIC BLOOD PRESSURE: 76 MMHG | SYSTOLIC BLOOD PRESSURE: 126 MMHG

## 2024-07-05 DIAGNOSIS — R39.9 UTI SYMPTOMS: ICD-10-CM

## 2024-07-05 DIAGNOSIS — L03.116 CELLULITIS OF LEFT LOWER EXTREMITY: ICD-10-CM

## 2024-07-05 DIAGNOSIS — N39.0 URINARY TRACT INFECTION WITHOUT HEMATURIA, SITE UNSPECIFIED: Primary | ICD-10-CM

## 2024-07-05 LAB
POC BILIRUBIN, URINE: NEGATIVE
POC BLOOD, URINE: ABNORMAL
POC GLUCOSE, URINE: NEGATIVE MG/DL
POC KETONES, URINE: NEGATIVE MG/DL
POC LEUKOCYTES, URINE: ABNORMAL
POC NITRITE,URINE: NEGATIVE
POC PH, URINE: 7 PH
POC PROTEIN, URINE: NEGATIVE MG/DL
POC SPECIFIC GRAVITY, URINE: 1.01
POC UROBILINOGEN, URINE: 0.2 EU/DL

## 2024-07-05 PROCEDURE — 87086 URINE CULTURE/COLONY COUNT: CPT

## 2024-07-05 PROCEDURE — 99214 OFFICE O/P EST MOD 30 MIN: CPT | Performed by: FAMILY MEDICINE

## 2024-07-05 PROCEDURE — 3078F DIAST BP <80 MM HG: CPT | Performed by: FAMILY MEDICINE

## 2024-07-05 PROCEDURE — 81003 URINALYSIS AUTO W/O SCOPE: CPT | Performed by: FAMILY MEDICINE

## 2024-07-05 PROCEDURE — 1036F TOBACCO NON-USER: CPT | Performed by: FAMILY MEDICINE

## 2024-07-05 PROCEDURE — 3074F SYST BP LT 130 MM HG: CPT | Performed by: FAMILY MEDICINE

## 2024-07-05 PROCEDURE — 1123F ACP DISCUSS/DSCN MKR DOCD: CPT | Performed by: FAMILY MEDICINE

## 2024-07-05 PROCEDURE — 1159F MED LIST DOCD IN RCRD: CPT | Performed by: FAMILY MEDICINE

## 2024-07-05 PROCEDURE — 1160F RVW MEDS BY RX/DR IN RCRD: CPT | Performed by: FAMILY MEDICINE

## 2024-07-05 PROCEDURE — 87186 SC STD MICRODIL/AGAR DIL: CPT

## 2024-07-05 RX ORDER — SULFAMETHOXAZOLE AND TRIMETHOPRIM 800; 160 MG/1; MG/1
1 TABLET ORAL 2 TIMES DAILY
Qty: 20 TABLET | Refills: 0 | Status: SHIPPED | OUTPATIENT
Start: 2024-07-05 | End: 2024-07-15

## 2024-07-05 NOTE — PROGRESS NOTES
Subjective   Patient ID: [id number]     Carmen Bauman is a 78 y.o. female who presents for Urinary discomfort and Urinary Incontinence.  HPI  She complains of dysuria.  That started one week ago. Has increased frequency and difficulty holding her urine.  Denies abdominal pain.  Back pain is no worse than normal. No fever. No dizziness.        UA here showed small blood and leucocytes.    She was in the ER on 6/17/24.  She had LLE edema.  She was Dx with left leg cellulitis. US was neg for DVT.  She was given augmentin and doxycycline.  The doxycycline was stopped after one dose due to vomiting.  She was not given any water pills.    Her cellulitis is only minimally improved.  She has an appointment next week with Dr Bates for a vein ablation.           Objective     /76   Temp 36.5 °C (97.7 °F)      Physical Exam  Constitutional:       Appearance: Normal appearance.   Cardiovascular:      Rate and Rhythm: Normal rate and regular rhythm.      Heart sounds: Normal heart sounds. No murmur heard.  Pulmonary:      Effort: Pulmonary effort is normal. No respiratory distress.      Breath sounds: Normal breath sounds.   Abdominal:      General: Abdomen is flat.      Palpations: Abdomen is soft.      Tenderness: There is no abdominal tenderness. There is no guarding or rebound.      Comments: [employer] neg.   Musculoskeletal:      Right lower leg: Edema present.      Left lower leg: Edema (LLE greater than RLE edema.) present.   Neurological:      General: No focal deficit present.      Mental Status: She is alert and oriented to person, place, and time.         Assessment/Plan   Problem List Items Addressed This Visit    None  Visit Diagnoses       Urinary tract infection without hematuria, site unspecified    -  Primary    Relevant Medications    sulfamethoxazole-trimethoprim (Bactrim DS) 800-160 mg tablet    UTI symptoms        Relevant Orders    POCT UA Automated manually resulted (Completed)    Cellulitis of left  lower extremity        Relevant Medications    sulfamethoxazole-trimethoprim (Bactrim DS) 800-160 mg tablet          I will prescribe a different antibiotic, Bactrim. I ordered a urine culture.  Follow up with Dr Maher in one week.  Follow up with Dr Bates as scheduled on Wednesday.  The Bactrim may be helpful for both a UTI and for cellulitis.  Duong Watts, DO

## 2024-07-05 NOTE — PATIENT INSTRUCTIONS
I will prescribe a different antibiotic, Bactrim. I ordered a urine culture.  Follow up with Dr Maher in one week.  Follow up with Dr Bates as scheduled on Wednesday.  The Bactrim may be helpful for both a UTI and for cellulitis.

## 2024-07-07 LAB — BACTERIA UR CULT: ABNORMAL

## 2024-07-08 LAB — BACTERIA UR CULT: ABNORMAL

## 2024-07-12 ENCOUNTER — APPOINTMENT (OUTPATIENT)
Dept: PRIMARY CARE | Facility: CLINIC | Age: 79
End: 2024-07-12
Payer: MEDICARE

## 2024-07-12 VITALS
SYSTOLIC BLOOD PRESSURE: 116 MMHG | TEMPERATURE: 96.6 F | BODY MASS INDEX: 28.59 KG/M2 | DIASTOLIC BLOOD PRESSURE: 68 MMHG | WEIGHT: 188 LBS

## 2024-07-12 DIAGNOSIS — M79.89 LEG SWELLING: Primary | ICD-10-CM

## 2024-07-12 DIAGNOSIS — I10 ESSENTIAL (PRIMARY) HYPERTENSION: ICD-10-CM

## 2024-07-12 DIAGNOSIS — Z96.651 HISTORY OF TOTAL RIGHT KNEE REPLACEMENT: ICD-10-CM

## 2024-07-12 DIAGNOSIS — M16.9 OSTEOARTHRITIS OF HIP, UNSPECIFIED LATERALITY, UNSPECIFIED OSTEOARTHRITIS TYPE: ICD-10-CM

## 2024-07-12 DIAGNOSIS — M96.1 LUMBAR POSTLAMINECTOMY SYNDROME: ICD-10-CM

## 2024-07-12 DIAGNOSIS — N39.0 URINARY TRACT INFECTION WITHOUT HEMATURIA, SITE UNSPECIFIED: ICD-10-CM

## 2024-07-12 PROCEDURE — 3074F SYST BP LT 130 MM HG: CPT | Performed by: FAMILY MEDICINE

## 2024-07-12 PROCEDURE — 99214 OFFICE O/P EST MOD 30 MIN: CPT | Performed by: FAMILY MEDICINE

## 2024-07-12 PROCEDURE — 3078F DIAST BP <80 MM HG: CPT | Performed by: FAMILY MEDICINE

## 2024-07-12 PROCEDURE — 1123F ACP DISCUSS/DSCN MKR DOCD: CPT | Performed by: FAMILY MEDICINE

## 2024-07-12 PROCEDURE — 1036F TOBACCO NON-USER: CPT | Performed by: FAMILY MEDICINE

## 2024-07-12 RX ORDER — FUROSEMIDE 20 MG/1
20 TABLET ORAL DAILY
Qty: 30 TABLET | Refills: 1 | Status: SHIPPED | OUTPATIENT
Start: 2024-07-12 | End: 2025-07-12

## 2024-07-12 RX ORDER — POTASSIUM CHLORIDE 750 MG/1
10 TABLET, FILM COATED, EXTENDED RELEASE ORAL 2 TIMES DAILY
Qty: 30 TABLET | Refills: 0 | Status: SHIPPED | OUTPATIENT
Start: 2024-07-12 | End: 2025-07-12

## 2024-07-12 RX ORDER — AMLODIPINE BESYLATE 2.5 MG/1
2.5 TABLET ORAL DAILY
Qty: 30 TABLET | Refills: 0 | Status: SHIPPED | OUTPATIENT
Start: 2024-07-12 | End: 2025-01-08

## 2024-07-12 NOTE — PROGRESS NOTES
Chief complaint:   Chief Complaint   Patient presents with    Follow-up     1 week follow up for UTI    Discuss medication    order for walker       HPI:  Carmen Bauman is a 78 y.o. female who presents for evaluation of UTI, 1 week follow up. Hives with bactrim have started but are tolerable. No more urinary sx. She has completed 6 or so days of the Bactrim    She had an ablation in legs on Wednesday this week. It was recommended for her to take a water pill. He states she does not have cellulitis but erythema.     She currently uses a cane and is requesting a walker to assist in ambulation    Physical exam:  /68   Temp 35.9 °C (96.6 °F)   Wt 85.3 kg (188 lb)   BMI 28.59 kg/m²   General: NAD, well appearing female  Heart: RRR, no mumur appreciated  Lungs: CTAB, no wheezes, rales, rhonchi  Extremities: bilateral LE edema L>R    Assessment/Plan   Problem List Items Addressed This Visit       Essential (primary) hypertension    Relevant Medications    amLODIPine (Norvasc) 2.5 mg tablet    History of total right knee replacement    Relevant Orders    Walker rolling    Lumbar postlaminectomy syndrome    Relevant Orders    Walker rolling    Degenerative joint disease (DJD) of hip    Relevant Orders    Walker rolling     Other Visit Diagnoses       Leg swelling    -  Primary    Relevant Medications    furosemide (Lasix) 20 mg tablet    potassium chloride CR (Klor-Con) 10 mEq ER tablet    Other Relevant Orders    Basic metabolic panel    Urinary tract infection without hematuria, site unspecified            Bactrim stopped, has had 6 days of therapy or more, hives have developed. Added bactrim to allergy list. Follow up if UTI sx return    She had an ablation in legs on Wednesday this week. It was recommended for her to take a water pill discussed this. Due to BP in office will decrease Amlodipine from 2.5 mg to 5 mg PO daily, initiate Lasix 20 mg PO daily with potassium 10 meq once daily. BMP in 1 mo. Follow up  in 2 weeks in office. Check BP daily at home.     Walker ordered    Ruba Maher, DO

## 2024-07-24 ENCOUNTER — LAB (OUTPATIENT)
Dept: LAB | Facility: LAB | Age: 79
End: 2024-07-24
Payer: MEDICARE

## 2024-07-24 DIAGNOSIS — M79.89 LEG SWELLING: ICD-10-CM

## 2024-07-24 PROCEDURE — 36415 COLL VENOUS BLD VENIPUNCTURE: CPT

## 2024-07-24 PROCEDURE — 80048 BASIC METABOLIC PNL TOTAL CA: CPT

## 2024-07-25 ENCOUNTER — PATIENT MESSAGE (OUTPATIENT)
Dept: PRIMARY CARE | Facility: CLINIC | Age: 79
End: 2024-07-25
Payer: MEDICARE

## 2024-07-25 LAB
ANION GAP SERPL CALC-SCNC: 13 MMOL/L (ref 10–20)
BUN SERPL-MCNC: 18 MG/DL (ref 6–23)
CALCIUM SERPL-MCNC: 9.5 MG/DL (ref 8.6–10.6)
CHLORIDE SERPL-SCNC: 100 MMOL/L (ref 98–107)
CO2 SERPL-SCNC: 30 MMOL/L (ref 21–32)
CREAT SERPL-MCNC: 0.91 MG/DL (ref 0.5–1.05)
EGFRCR SERPLBLD CKD-EPI 2021: 65 ML/MIN/1.73M*2
GLUCOSE SERPL-MCNC: 80 MG/DL (ref 74–99)
POTASSIUM SERPL-SCNC: 4.4 MMOL/L (ref 3.5–5.3)
SODIUM SERPL-SCNC: 139 MMOL/L (ref 136–145)

## 2024-07-26 ENCOUNTER — APPOINTMENT (OUTPATIENT)
Dept: PRIMARY CARE | Facility: CLINIC | Age: 79
End: 2024-07-26
Payer: MEDICARE

## 2024-07-26 VITALS — SYSTOLIC BLOOD PRESSURE: 118 MMHG | WEIGHT: 188 LBS | DIASTOLIC BLOOD PRESSURE: 70 MMHG | BODY MASS INDEX: 28.59 KG/M2

## 2024-07-26 DIAGNOSIS — M79.89 LEG SWELLING: ICD-10-CM

## 2024-07-26 DIAGNOSIS — I10 ESSENTIAL (PRIMARY) HYPERTENSION: ICD-10-CM

## 2024-07-26 PROCEDURE — 99213 OFFICE O/P EST LOW 20 MIN: CPT | Performed by: FAMILY MEDICINE

## 2024-07-26 PROCEDURE — 1036F TOBACCO NON-USER: CPT | Performed by: FAMILY MEDICINE

## 2024-07-26 PROCEDURE — 3074F SYST BP LT 130 MM HG: CPT | Performed by: FAMILY MEDICINE

## 2024-07-26 PROCEDURE — 1123F ACP DISCUSS/DSCN MKR DOCD: CPT | Performed by: FAMILY MEDICINE

## 2024-07-26 PROCEDURE — 3078F DIAST BP <80 MM HG: CPT | Performed by: FAMILY MEDICINE

## 2024-07-26 RX ORDER — FUROSEMIDE 20 MG/1
20 TABLET ORAL DAILY
Qty: 90 TABLET | Refills: 1 | Status: SHIPPED | OUTPATIENT
Start: 2024-07-26 | End: 2025-07-26

## 2024-07-26 RX ORDER — POTASSIUM CHLORIDE 750 MG/1
10 TABLET, FILM COATED, EXTENDED RELEASE ORAL DAILY
Qty: 90 TABLET | Refills: 1 | Status: SHIPPED | OUTPATIENT
Start: 2024-07-26 | End: 2025-07-26

## 2024-07-26 RX ORDER — AMLODIPINE BESYLATE 2.5 MG/1
2.5 TABLET ORAL DAILY
Qty: 90 TABLET | Refills: 1 | Status: SHIPPED | OUTPATIENT
Start: 2024-07-26 | End: 2025-01-22

## 2024-07-26 NOTE — PROGRESS NOTES
Chief complaint:   Chief Complaint   Patient presents with    2 week follow up    Med Refill     Amlodipine, Furosemide, Losartan, Potassium       HPI:  Carmen Bauman is a 78 y.o. female who presents for evaluation of her blood pressure and leg swelling since adjusting her BP medication.     Has incresaed urination with the addition of lasix and has lost 3 lbs since starting. Swelling is improved with the furosemide though still present and she uses compression stockings.     Physical exam:  /70   Wt 85.3 kg (188 lb)   BMI 28.59 kg/m²   General: NAD, well appearing female  Heart: RRR, no mumur appreciated  Lungs: CTAB, no wheezes, rales, rhonchi  Abdomen: soft, non tender, normoactive BS, no organomegaly  Extremities: wearing compression stockings    Assessment/Plan   Problem List Items Addressed This Visit       Essential (primary) hypertension    Relevant Medications    amLODIPine (Norvasc) 2.5 mg tablet     Other Visit Diagnoses       Leg swelling        Relevant Medications    potassium chloride CR (Klor-Con) 10 mEq ER tablet    furosemide (Lasix) 20 mg tablet        Continue Amlodipine from 2.5 mg PO daily, Lasix 20 mg PO daily with potassium 10 meq once daily, and continue Losartan 100 mg PO daily. Reviewed BMP today with patient. Follow up 3 mo for re-evaluation.    Ruba Maher DO

## 2024-07-31 ENCOUNTER — TELEPHONE (OUTPATIENT)
Dept: PAIN MEDICINE | Facility: CLINIC | Age: 79
End: 2024-07-31
Payer: MEDICARE

## 2024-07-31 NOTE — TELEPHONE ENCOUNTER
Pt left a VM that she wants to schedule an inj.  She was last in to see you in Jan.  Will she need an office visit first?

## 2024-08-02 ENCOUNTER — OFFICE VISIT (OUTPATIENT)
Dept: PAIN MEDICINE | Facility: CLINIC | Age: 79
End: 2024-08-02
Payer: MEDICARE

## 2024-08-02 VITALS
RESPIRATION RATE: 18 BRPM | TEMPERATURE: 97.5 F | WEIGHT: 188 LBS | BODY MASS INDEX: 28.49 KG/M2 | HEART RATE: 71 BPM | SYSTOLIC BLOOD PRESSURE: 125 MMHG | DIASTOLIC BLOOD PRESSURE: 62 MMHG | OXYGEN SATURATION: 93 % | HEIGHT: 68 IN

## 2024-08-02 DIAGNOSIS — M54.16 LUMBAR RADICULOPATHY: ICD-10-CM

## 2024-08-02 DIAGNOSIS — Z79.891 LONG TERM CURRENT USE OF OPIATE ANALGESIC: Primary | ICD-10-CM

## 2024-08-02 DIAGNOSIS — M96.1 POSTLAMINECTOMY SYNDROME OF LUMBAR REGION: ICD-10-CM

## 2024-08-02 LAB
AMPHETAMINES UR QL SCN: NORMAL
BARBITURATES UR QL SCN: NORMAL
BZE UR QL SCN: NORMAL
CANNABINOIDS UR QL SCN: NORMAL
CREAT UR-MCNC: 54 MG/DL (ref 20–320)
PCP UR QL SCN: NORMAL

## 2024-08-02 PROCEDURE — 3074F SYST BP LT 130 MM HG: CPT | Performed by: ANESTHESIOLOGY

## 2024-08-02 PROCEDURE — 3078F DIAST BP <80 MM HG: CPT | Performed by: ANESTHESIOLOGY

## 2024-08-02 PROCEDURE — 99214 OFFICE O/P EST MOD 30 MIN: CPT | Performed by: ANESTHESIOLOGY

## 2024-08-02 PROCEDURE — 1036F TOBACCO NON-USER: CPT | Performed by: ANESTHESIOLOGY

## 2024-08-02 PROCEDURE — 1123F ACP DISCUSS/DSCN MKR DOCD: CPT | Performed by: ANESTHESIOLOGY

## 2024-08-02 PROCEDURE — 80307 DRUG TEST PRSMV CHEM ANLYZR: CPT | Performed by: ANESTHESIOLOGY

## 2024-08-02 RX ORDER — ACETAMINOPHEN 100 %
POWDER (GRAM) MISCELLANEOUS
Qty: 40 G | Refills: 11 | Status: SHIPPED | OUTPATIENT
Start: 2024-08-02

## 2024-08-02 ASSESSMENT — PAIN DESCRIPTION - DESCRIPTORS: DESCRIPTORS: ACHING;SHARP;STABBING

## 2024-08-02 ASSESSMENT — PAIN - FUNCTIONAL ASSESSMENT: PAIN_FUNCTIONAL_ASSESSMENT: 0-10

## 2024-08-02 ASSESSMENT — ENCOUNTER SYMPTOMS
DEPRESSION: 0
LOSS OF SENSATION IN FEET: 1
OCCASIONAL FEELINGS OF UNSTEADINESS: 1

## 2024-08-02 ASSESSMENT — PAIN SCALES - GENERAL
PAINLEVEL_OUTOF10: 5 - MODERATE PAIN
PAINLEVEL: 5

## 2024-08-02 NOTE — PROGRESS NOTES
SUBJECTIVE:  This is 78 y.o.  female with PMH of (patient of my brother) with a history of anxiety/depression, fatigue, history of L4-S1 fusion with disease at the L3-4 and significant spondylosis and endplate changes at L1-2 and also has another problem with cervical spondylosis and radiculopathy.  Patient responded very well to right L3-4 TFESI with 90% improvement and bilateral C6-7 ADONIS last injection 11/13/2023 with 80% improvement.  The patient also takes tramadol 4 times daily as needed from us who is here for follow-up who is complaining mostly of lower back pain correlate with her L3-4 disease above the fusion in addition to right gluteal area that she uses the sitting pad for it and that is helping partially.  The patient responds to tramadol very well with her therapy but she does not like to take it much and now she is thinking of taking it twice a day which I agree with her.  We are going to proceed with bilateral L3-4 TFESI above the fusion.  She may need caudal ADONIS for her gluteal pain since she has fusion extended to the S1-S2      Prior office visit:  1/11/2024: This is 78 y.o.  female with PMH of (patient of my brother) with a history of anxiety/depression, fatigue, history of L4-S1 fusion with disease at the L3-4 and significant spondylosis and endplate changes at L1-2 who has also cervical radiculopathy who had 80% improvement in pain and function after cervical ADONIS and 90% improvement pain and function of the right L3-4 TFESI,  who is here for follow-up complaining of right gluteal pain that over the ischial tuberosity which correlate with bursitis and I recommended for her to use a cushion while she is sitting  Orders for L3-4 TFESI and order for cervical ADONIS was placed for the patient when she calls             Last procedure:   11/13/2023 bilateral C6-7 cervical ADONIS patient has had 80% improvement in pain and function  9/11/2023 right L3-4 TFESI  the patient has had a 90% improvement in pain  and function     Portions of record reviewed for pertinent issues: active problem list, medication list, allergies, family history, social history, notes from last encounter, encounters, lab results, imaging and other system records.        I have personally reviewed the OARRS report for this patient. This report is scanned into the electronic medical record. I have considered the risks of abuse, dependence, addiction and diversion. It showed tramadol 1-2 times daily from us  OPIOID RISK ASSESSMENT SCORE 2/26  Opioid agreement: 10/26/2023  Activities of daily living: Very active doing aquatic therapy 3 times a week  Adverse effects: None  Analgesia: W/O 8/10, W 3/10 takes 1 or 2 tramadol per day as needed  Toxicology screen 1/26/2023  Aberrant behavior: None  Patient is being treated with tramadol therapy for pain and is responding appropriately.  There are NO signs of tramadol intoxication, abuse, addiction or withdrawal. Pupils are equal, reactive to light bilateral, appropriate speech and cognition. Patient denies any opioid induced constipation. The OARRS registry followed periodically, urine toxicology completed and appropriate.      Patient is advised and warned in specific detail about potential benefits of opioids along with risks and side effects including, but not limited to, dependency, addiction, tolerance, hyperalgesia, anxiety, depression, insomnia, endocrine changes, immunologic disturbances, respiratory depression and death.      Caution advised regarding the use of medications prescribed at this office and specific mention made regarding not to drive or operate heavy machinery if feeling side effects from this the medications. Patient expressed an understanding in regards to these particular concerns.      Discussed non-opioid options including (But no limited), physical wellness, antiinflammatory diet, icing and heating, meditation, relaxation, massage and acupuncture.     We will continue to  monitor and adjust medications as needed.           Employment/pending law suits: Used to work for a [employer] company for many years  Social History: Single now lives in [facility] her friend is a patient of mine, no children, finished masters degree in counseling, denies smoking drinking or use of illicit drugs                   Diagnostic studies:  11/10/2022 MRI of the lumbar spine showed L4-S1 fusion with disease above the fusion at the L3-4 in addition to disc herniation at the L2-3. No bone marrow edema and no endplate changes report was scanned into the system              Review of Systems   HENT: Negative.     Eyes: Negative.    Respiratory: Negative.     Cardiovascular: Negative.    Gastrointestinal: Negative.    Endocrine: Negative.    Genitourinary: Negative.    Musculoskeletal:  Positive for arthralgias, back pain, myalgias and neck pain.   Skin: Negative.    Neurological: Negative.    Hematological: Negative.    Psychiatric/Behavioral: Negative.           Physical Exam  Vitals and nursing note reviewed.   Constitutional:       Appearance: Normal appearance.   HENT:      Head: Normocephalic and atraumatic.      Nose: Nose normal.   Eyes:      Extraocular Movements: Extraocular movements intact.      Conjunctiva/sclera: Conjunctivae normal.      Pupils: Pupils are equal, round, and reactive to light.   Cardiovascular:      Rate and Rhythm: Normal rate and regular rhythm.      Pulses: Normal pulses.      Heart sounds: Normal heart sounds.   Pulmonary:      Effort: Pulmonary effort is normal.      Breath sounds: Normal breath sounds.   Abdominal:      General: Abdomen is flat. Bowel sounds are normal.      Palpations: Abdomen is soft.   Musculoskeletal:         General: Tenderness present.   Skin:     General: Skin is warm.   Neurological:      General: No focal deficit present.      Mental Status: She is alert and oriented to person, place, and time.   Psychiatric:         Mood and  Affect: Mood normal.         Behavior: Behavior normal.                     Plan  At least 50% of the visit was involved in the discussion of the options for treatment. We discussed exercises, medication, interventional therapies and surgery. Healthy life style is essential with patient hard work to achieve the wellness. In addition; discussion with the patient and/or family about any of the diagnostic results, impressions and/or recommended diagnostic studies, prognosis, risks and benefits of treatment options, instructions for treatment and/or follow-up, importance of compliance with chosen treatment options, risk-factor reduction, and patient/family education.         Pool therapy, walking in the pool, at least 3x per week for 30 minutes  Continue self-directed physical therapy  Bilateral L2-3 TFESI soon consent signed 1/11/2024  May consider caudal ADONIS to treat the right-sided S1 radiculopathy pain  Patient may use tramadol up to 3 times daily  Healthy lifestyle and anti-inflammatory diet in addition to weight control discussed with the patient  Alternative chronic pain therapies was discussed, encouraged and information was handed  Return to Clinic 3 months     *Please note this report has been produced using speech recognition software and may contain errors related to that system including grammar, punctuation and spelling as well as words and phrases that may be inappropriate. If there are questions or concerns, please feel free to contact me to clarify.    Rey Adair MD

## 2024-08-02 NOTE — PROGRESS NOTES
This is 78 y.o.  female with who has been treated for Lower back pain and neck pain. Pain is worse, The pain is described as achiness, sharp, and stabbing and is relieved by Medications tramadol   with who is here for follow-up No chief complaint on file.      Pain Therapies: Injections and Tramadol

## 2024-08-05 ENCOUNTER — TELEPHONE (OUTPATIENT)
Dept: PAIN MEDICINE | Facility: CLINIC | Age: 79
End: 2024-08-05
Payer: MEDICARE

## 2024-08-05 DIAGNOSIS — M54.16 LUMBAR RADICULOPATHY: ICD-10-CM

## 2024-08-05 DIAGNOSIS — M96.1 POSTLAMINECTOMY SYNDROME OF LUMBAR REGION: ICD-10-CM

## 2024-08-05 DIAGNOSIS — Z79.891 LONG TERM CURRENT USE OF OPIATE ANALGESIC: ICD-10-CM

## 2024-08-05 RX ORDER — ACETAMINOPHEN 100 %
POWDER (GRAM) MISCELLANEOUS
Qty: 40 G | Refills: 11 | Status: SHIPPED | OUTPATIENT
Start: 2024-08-05

## 2024-08-05 NOTE — TELEPHONE ENCOUNTER
[facility] called and said they are unable to fill her Rx for the Ketoprofen Powder.   That needs to go to a specialty pharmacy.

## 2024-08-07 LAB
6MAM UR CFM-MCNC: <25 NG/ML
CODEINE UR CFM-MCNC: <50 NG/ML
HYDROCODONE CTO UR CFM-MCNC: <25 NG/ML
HYDROMORPHONE UR CFM-MCNC: <25 NG/ML
MORPHINE UR CFM-MCNC: <50 NG/ML
NORHYDROCODONE UR CFM-MCNC: <25 NG/ML
NOROXYCODONE UR CFM-MCNC: <25 NG/ML
OXYCODONE UR CFM-MCNC: <25 NG/ML
OXYMORPHONE UR CFM-MCNC: <25 NG/ML

## 2024-08-23 ENCOUNTER — TELEPHONE (OUTPATIENT)
Dept: PAIN MEDICINE | Facility: CLINIC | Age: 79
End: 2024-08-23
Payer: MEDICARE

## 2024-08-26 ENCOUNTER — APPOINTMENT (OUTPATIENT)
Dept: PAIN MEDICINE | Facility: CLINIC | Age: 79
End: 2024-08-26
Payer: MEDICARE

## 2024-09-01 DIAGNOSIS — F43.0 ACUTE REACTION TO STRESS: ICD-10-CM

## 2024-09-03 RX ORDER — BUPROPION HYDROCHLORIDE 150 MG/1
150 TABLET ORAL DAILY
Qty: 90 TABLET | Refills: 3 | OUTPATIENT
Start: 2024-09-03

## 2024-09-09 ENCOUNTER — HOSPITAL ENCOUNTER (OUTPATIENT)
Dept: PAIN MEDICINE | Facility: CLINIC | Age: 79
Discharge: HOME | End: 2024-09-09
Payer: MEDICARE

## 2024-09-09 VITALS
SYSTOLIC BLOOD PRESSURE: 140 MMHG | OXYGEN SATURATION: 99 % | RESPIRATION RATE: 16 BRPM | DIASTOLIC BLOOD PRESSURE: 73 MMHG | HEART RATE: 65 BPM | TEMPERATURE: 98.2 F

## 2024-09-09 DIAGNOSIS — M54.16 LUMBAR RADICULOPATHY: ICD-10-CM

## 2024-09-09 DIAGNOSIS — M96.1 POSTLAMINECTOMY SYNDROME OF LUMBAR REGION: ICD-10-CM

## 2024-09-09 DIAGNOSIS — Z79.891 LONG TERM CURRENT USE OF OPIATE ANALGESIC: ICD-10-CM

## 2024-09-09 DIAGNOSIS — K21.9 GASTROESOPHAGEAL REFLUX DISEASE WITHOUT ESOPHAGITIS: ICD-10-CM

## 2024-09-09 PROCEDURE — 2500000004 HC RX 250 GENERAL PHARMACY W/ HCPCS (ALT 636 FOR OP/ED): Performed by: ANESTHESIOLOGY

## 2024-09-09 PROCEDURE — 64483 NJX AA&/STRD TFRM EPI L/S 1: CPT | Mod: 50 | Performed by: ANESTHESIOLOGY

## 2024-09-09 PROCEDURE — 2550000001 HC RX 255 CONTRASTS: Performed by: ANESTHESIOLOGY

## 2024-09-09 PROCEDURE — 2500000005 HC RX 250 GENERAL PHARMACY W/O HCPCS: Performed by: ANESTHESIOLOGY

## 2024-09-09 PROCEDURE — 64483 NJX AA&/STRD TFRM EPI L/S 1: CPT | Performed by: ANESTHESIOLOGY

## 2024-09-09 RX ORDER — OMEPRAZOLE 20 MG/1
20 CAPSULE, DELAYED RELEASE ORAL DAILY
Qty: 90 CAPSULE | Refills: 3 | OUTPATIENT
Start: 2024-09-09

## 2024-09-09 RX ORDER — LIDOCAINE HYDROCHLORIDE 5 MG/ML
INJECTION, SOLUTION INFILTRATION; INTRAVENOUS AS NEEDED
Status: COMPLETED | OUTPATIENT
Start: 2024-09-09 | End: 2024-09-09

## 2024-09-09 RX ORDER — METHYLPREDNISOLONE ACETATE 80 MG/ML
INJECTION, SUSPENSION INTRA-ARTICULAR; INTRALESIONAL; INTRAMUSCULAR; SOFT TISSUE AS NEEDED
Status: COMPLETED | OUTPATIENT
Start: 2024-09-09 | End: 2024-09-09

## 2024-09-09 ASSESSMENT — PAIN - FUNCTIONAL ASSESSMENT: PAIN_FUNCTIONAL_ASSESSMENT: 0-10

## 2024-09-09 ASSESSMENT — ENCOUNTER SYMPTOMS
PSYCHIATRIC NEGATIVE: 1
HEMATOLOGIC/LYMPHATIC NEGATIVE: 1
EYES NEGATIVE: 1
RESPIRATORY NEGATIVE: 1
MYALGIAS: 1
CARDIOVASCULAR NEGATIVE: 1
LOSS OF SENSATION IN FEET: 0
OCCASIONAL FEELINGS OF UNSTEADINESS: 1
ENDOCRINE NEGATIVE: 1
DEPRESSION: 0
GASTROINTESTINAL NEGATIVE: 1
NUMBNESS: 1
BACK PAIN: 1

## 2024-09-09 ASSESSMENT — COLUMBIA-SUICIDE SEVERITY RATING SCALE - C-SSRS
1. IN THE PAST MONTH, HAVE YOU WISHED YOU WERE DEAD OR WISHED YOU COULD GO TO SLEEP AND NOT WAKE UP?: NO
2. HAVE YOU ACTUALLY HAD ANY THOUGHTS OF KILLING YOURSELF?: NO
6. HAVE YOU EVER DONE ANYTHING, STARTED TO DO ANYTHING, OR PREPARED TO DO ANYTHING TO END YOUR LIFE?: NO

## 2024-09-09 ASSESSMENT — PAIN SCALES - GENERAL
PAINLEVEL_OUTOF10: 0 - NO PAIN
PAINLEVEL_OUTOF10: 7

## 2024-09-09 NOTE — H&P
History Of Present Illness  Carmen Bauman is a 78 y.o. female presenting with lower back pain with radiculopathy there are no changes in health medical management of allergies since her last visit on 8/2/2024.     Past Medical History  Past Medical History:   Diagnosis Date    Acute bronchitis 01/03/2024    Allergy status to unspecified drugs, medicaments and biological substances     History of seasonal allergies    Anemia 01/03/2024    Chronic kidney disease, stage 3 unspecified ([facility]) 08/02/2019    CKD (chronic kidney disease) stage 3, GFR 30-59 ml/min    Combined forms of age-related cataract, bilateral 02/10/2020    Combined form of age-related cataract, both eyes    Combined forms of age-related cataract, right eye 09/14/2020    Combined form of age-related cataract, right eye    Dysuria 01/03/2024    Edema 01/03/2024    Encounter for other preprocedural examination 07/06/2018    Preoperative clearance    Encounter for screening for malignant neoplasm, site unspecified     Encounter for Pap smear    Essential (primary) hypertension 07/06/2018    Benign essential hypertension    Fatigue 04/11/2023    Hemangioma of skin and subcutaneous tissue 05/27/2021    Hyperlipidemia, unspecified 08/19/2022    Hyperlipidemia    Insomnia 04/11/2023    Osteoarthritis of hip, unspecified 10/13/2021    Degenerative joint disease (DJD) of hip    Other melanin hyperpigmentation 05/27/2021    Other seborrheic keratosis 05/27/2021    Pain following oral surgery 04/11/2023    Pain in unspecified joint 12/18/2019    Polyarthralgia    Pain in unspecified knee 05/23/2016    Joint pain, knee    Pain in unspecified wrist     Wrist pain    Pain in wrist 01/03/2024    Comment on above: noted as right wrist with no other description;    Pain, unspecified 08/11/2022    Personal history of other diseases of the circulatory system 12/11/2020    History of supraventricular tachycardia    Personal history of other diseases of the respiratory  system 03/15/2022    History of acute bronchitis    Personal history of other medical treatment     H/O mammogram    Personal history of other specified conditions 07/06/2018    History of palpitations    Personal history of other specified conditions 08/09/2018    History of palpitations    PVCs (premature ventricular contractions) 04/11/2023    Rash and other nonspecific skin eruption 05/27/2021    Superficial foreign body, left foot, initial encounter 07/02/2020    Foreign body in left foot, initial encounter    Superficial foreign body, left foot, initial encounter 07/02/2020    Foreign body in left foot, initial encounter    Supraventricular tachycardia ([facility]) 09/24/2019    Unspecified asthma, uncomplicated ([facility]) 12/17/2021    Asthma    Unspecified asthma, uncomplicated ([facility]) 01/27/2020    AB (asthmatic bronchitis)    Urinary tract infection, site not specified 07/31/2022    Acute UTI (urinary tract infection)    Viral upper respiratory tract infection 01/03/2024       Surgical History  Past Surgical History:   Procedure Laterality Date    COLONOSCOPY  12/07/2020    Colonoscopy (Fiberoptic)    DILATION AND CURETTAGE OF UTERUS  03/28/2014    Dilation And Curettage    OTHER SURGICAL HISTORY  03/28/2014    Hysteroscopy    OTHER SURGICAL HISTORY  08/19/2022    Knee replacement        Social History  She reports that she has quit smoking. Her smoking use included cigarettes. She has a 1.3 pack-year smoking history. She has never been exposed to tobacco smoke. She has never used smokeless tobacco. She reports current alcohol use. She reports that she does not use drugs.    Family History  Family History   Problem Relation Name Age of Onset    Hypertension Mother      Osteoarthritis Mother      Hypertension Father      Other (malignant neoplasm of urinary bladder) Father      Other (cerebral) Maternal Grandmother      COPD Paternal Grandmother          Allergies  Bactrim [sulfamethoxazole-trimethoprim],  House dust, Mold, Pollen extracts, Clarithromycin, and Clavulanic acid    Review of Systems   HENT: Negative.     Eyes: Negative.    Respiratory: Negative.     Cardiovascular: Negative.    Gastrointestinal: Negative.    Endocrine: Negative.    Genitourinary: Negative.    Musculoskeletal:  Positive for back pain and myalgias.   Skin: Negative.    Neurological:  Positive for numbness.   Hematological: Negative.    Psychiatric/Behavioral: Negative.          Physical Exam  Vitals and nursing note reviewed.   Constitutional:       Appearance: Normal appearance.   HENT:      Head: Normocephalic and atraumatic.      Nose: Nose normal.   Eyes:      Extraocular Movements: Extraocular movements intact.      Conjunctiva/sclera: Conjunctivae normal.      Pupils: Pupils are equal, round, and reactive to light.   Cardiovascular:      Rate and Rhythm: Normal rate and regular rhythm.      Pulses: Normal pulses.      Heart sounds: Normal heart sounds.   Pulmonary:      Effort: Pulmonary effort is normal.      Breath sounds: Normal breath sounds.   Abdominal:      General: Abdomen is flat. Bowel sounds are normal.      Palpations: Abdomen is soft.   Musculoskeletal:         General: Tenderness present.   Skin:     General: Skin is warm.   Neurological:      General: No focal deficit present.      Mental Status: She is alert and oriented to person, place, and time.   Psychiatric:         Mood and Affect: Mood normal.         Behavior: Behavior normal.          Last Recorded Vitals  Blood pressure 140/73, pulse 62, temperature 36.8 °C (98.2 °F), temperature source Tympanic, resp. rate 18.    Relevant Results             Assessment/Plan   Assessment & Plan  Long term current use of opiate analgesic    Postlaminectomy syndrome of lumbar region    Lumbar radiculopathy      Discussed transforaminal epidural steroid injection and the patient agreed to it             Rey Adair MD

## 2024-09-13 DIAGNOSIS — G89.4 CHRONIC PAIN SYNDROME: ICD-10-CM

## 2024-09-13 RX ORDER — TRAMADOL HYDROCHLORIDE 50 MG/1
50 TABLET ORAL EVERY 6 HOURS PRN
Qty: 120 TABLET | Refills: 2 | Status: SHIPPED | OUTPATIENT
Start: 2024-09-13

## 2024-09-27 DIAGNOSIS — F41.1 GENERALIZED ANXIETY DISORDER: ICD-10-CM

## 2024-09-27 DIAGNOSIS — F33.41 RECURRENT MAJOR DEPRESSIVE DISORDER, IN PARTIAL REMISSION (CMS-HCC): ICD-10-CM

## 2024-09-27 RX ORDER — SERTRALINE HYDROCHLORIDE 50 MG/1
50 TABLET, FILM COATED ORAL DAILY
Qty: 90 TABLET | Refills: 3 | OUTPATIENT
Start: 2024-09-27

## 2024-10-09 ENCOUNTER — APPOINTMENT (OUTPATIENT)
Dept: PRIMARY CARE | Facility: CLINIC | Age: 79
End: 2024-10-09
Payer: MEDICARE

## 2024-10-15 ENCOUNTER — TELEPHONE (OUTPATIENT)
Dept: PRIMARY CARE | Facility: CLINIC | Age: 79
End: 2024-10-15
Payer: MEDICARE

## 2024-10-15 DIAGNOSIS — F43.0 ACUTE REACTION TO STRESS: ICD-10-CM

## 2024-10-15 RX ORDER — BUPROPION HYDROCHLORIDE 150 MG/1
150 TABLET ORAL DAILY
Qty: 90 TABLET | Refills: 1 | Status: SHIPPED | OUTPATIENT
Start: 2024-10-15

## 2024-10-15 RX ORDER — BUPROPION HYDROCHLORIDE 150 MG/1
150 TABLET ORAL DAILY
Qty: 30 TABLET | Refills: 1 | Status: SHIPPED | OUTPATIENT
Start: 2024-10-15

## 2024-10-15 NOTE — TELEPHONE ENCOUNTER
Pt is completely out of medication pt is asking if she can get a 30 day supply sent to [facility] in [place] until she can get her 90 day supply from [facility].     30 day Supply:     Medication:    Bupropion  MG; Take 1 tablet daily.    Pharmacy: [facility]   Phone Number: [phone]      90 day supply:     Medication:    Bupropion  MG; Take 1 tablet daily.    Pharmacy: [facility]

## 2024-10-30 ENCOUNTER — TELEPHONE (OUTPATIENT)
Dept: PRIMARY CARE | Facility: CLINIC | Age: 79
End: 2024-10-30

## 2024-10-30 ENCOUNTER — APPOINTMENT (OUTPATIENT)
Dept: PRIMARY CARE | Facility: CLINIC | Age: 79
End: 2024-10-30
Payer: MEDICARE

## 2024-10-30 VITALS
SYSTOLIC BLOOD PRESSURE: 124 MMHG | DIASTOLIC BLOOD PRESSURE: 70 MMHG | WEIGHT: 190 LBS | TEMPERATURE: 96.8 F | BODY MASS INDEX: 28.89 KG/M2

## 2024-10-30 DIAGNOSIS — F41.1 GENERALIZED ANXIETY DISORDER: ICD-10-CM

## 2024-10-30 DIAGNOSIS — F33.41 RECURRENT MAJOR DEPRESSIVE DISORDER, IN PARTIAL REMISSION (CMS-HCC): ICD-10-CM

## 2024-10-30 DIAGNOSIS — M79.89 LEG SWELLING: ICD-10-CM

## 2024-10-30 DIAGNOSIS — K21.9 GASTROESOPHAGEAL REFLUX DISEASE WITHOUT ESOPHAGITIS: ICD-10-CM

## 2024-10-30 DIAGNOSIS — J45.909 UNCOMPLICATED ASTHMA, UNSPECIFIED ASTHMA SEVERITY, UNSPECIFIED WHETHER PERSISTENT (HHS-HCC): ICD-10-CM

## 2024-10-30 DIAGNOSIS — J45.909 ASTHMA, UNSPECIFIED ASTHMA SEVERITY, UNSPECIFIED WHETHER COMPLICATED, UNSPECIFIED WHETHER PERSISTENT (HHS-HCC): ICD-10-CM

## 2024-10-30 DIAGNOSIS — E78.5 HYPERLIPIDEMIA, UNSPECIFIED HYPERLIPIDEMIA TYPE: ICD-10-CM

## 2024-10-30 DIAGNOSIS — I10 BENIGN ESSENTIAL HYPERTENSION: ICD-10-CM

## 2024-10-30 DIAGNOSIS — M25.551 PAIN OF RIGHT HIP: Primary | ICD-10-CM

## 2024-10-30 DIAGNOSIS — F41.9 ANXIETY DISORDER, UNSPECIFIED TYPE: ICD-10-CM

## 2024-10-30 DIAGNOSIS — F43.0 ACUTE REACTION TO STRESS: ICD-10-CM

## 2024-10-30 DIAGNOSIS — I10 ESSENTIAL (PRIMARY) HYPERTENSION: ICD-10-CM

## 2024-10-30 PROCEDURE — G2211 COMPLEX E/M VISIT ADD ON: HCPCS | Performed by: FAMILY MEDICINE

## 2024-10-30 PROCEDURE — 1123F ACP DISCUSS/DSCN MKR DOCD: CPT | Performed by: FAMILY MEDICINE

## 2024-10-30 PROCEDURE — 3074F SYST BP LT 130 MM HG: CPT | Performed by: FAMILY MEDICINE

## 2024-10-30 PROCEDURE — 3078F DIAST BP <80 MM HG: CPT | Performed by: FAMILY MEDICINE

## 2024-10-30 PROCEDURE — 1036F TOBACCO NON-USER: CPT | Performed by: FAMILY MEDICINE

## 2024-10-30 PROCEDURE — 99214 OFFICE O/P EST MOD 30 MIN: CPT | Performed by: FAMILY MEDICINE

## 2024-10-30 RX ORDER — OMEPRAZOLE 20 MG/1
20 TABLET, DELAYED RELEASE ORAL DAILY
Qty: 90 TABLET | Refills: 3 | Status: SHIPPED | OUTPATIENT
Start: 2024-10-30 | End: 2024-10-30

## 2024-10-30 RX ORDER — SERTRALINE HYDROCHLORIDE 50 MG/1
50 TABLET, FILM COATED ORAL DAILY
Qty: 90 TABLET | Refills: 3 | Status: SHIPPED | OUTPATIENT
Start: 2024-10-30 | End: 2024-10-30

## 2024-10-30 RX ORDER — BUSPIRONE HYDROCHLORIDE 15 MG/1
15 TABLET ORAL 2 TIMES DAILY PRN
Qty: 90 TABLET | Refills: 3 | Status: SHIPPED | OUTPATIENT
Start: 2024-10-30 | End: 2024-10-30

## 2024-10-30 RX ORDER — LOSARTAN POTASSIUM 100 MG/1
100 TABLET ORAL DAILY
Qty: 100 TABLET | Refills: 3 | Status: SHIPPED | OUTPATIENT
Start: 2024-10-30 | End: 2025-12-04

## 2024-10-30 RX ORDER — ATORVASTATIN CALCIUM 10 MG/1
10 TABLET, FILM COATED ORAL DAILY
Qty: 90 TABLET | Refills: 3 | Status: SHIPPED | OUTPATIENT
Start: 2024-10-30 | End: 2024-10-30

## 2024-10-30 RX ORDER — SERTRALINE HYDROCHLORIDE 50 MG/1
50 TABLET, FILM COATED ORAL DAILY
Qty: 90 TABLET | Refills: 3 | Status: SHIPPED | OUTPATIENT
Start: 2024-10-30 | End: 2025-10-25

## 2024-10-30 RX ORDER — METOPROLOL SUCCINATE 50 MG/1
50 TABLET, EXTENDED RELEASE ORAL DAILY
Qty: 90 TABLET | Refills: 3 | Status: SHIPPED | OUTPATIENT
Start: 2024-10-30

## 2024-10-30 RX ORDER — ATORVASTATIN CALCIUM 10 MG/1
10 TABLET, FILM COATED ORAL DAILY
Qty: 90 TABLET | Refills: 3 | Status: SHIPPED | OUTPATIENT
Start: 2024-10-30

## 2024-10-30 RX ORDER — METOPROLOL SUCCINATE 50 MG/1
50 TABLET, EXTENDED RELEASE ORAL DAILY
Qty: 90 TABLET | Refills: 3 | Status: SHIPPED | OUTPATIENT
Start: 2024-10-30 | End: 2024-10-30

## 2024-10-30 RX ORDER — BUSPIRONE HYDROCHLORIDE 15 MG/1
15 TABLET ORAL 2 TIMES DAILY PRN
Qty: 90 TABLET | Refills: 3 | Status: SHIPPED | OUTPATIENT
Start: 2024-10-30

## 2024-10-30 RX ORDER — BUPROPION HYDROCHLORIDE 150 MG/1
150 TABLET ORAL DAILY
Qty: 90 TABLET | Refills: 3 | Status: SHIPPED | OUTPATIENT
Start: 2024-10-30 | End: 2024-10-30

## 2024-10-30 RX ORDER — FUROSEMIDE 20 MG/1
20 TABLET ORAL DAILY
Qty: 90 TABLET | Refills: 3 | Status: SHIPPED | OUTPATIENT
Start: 2024-10-30 | End: 2025-10-30

## 2024-10-30 RX ORDER — ALBUTEROL SULFATE 90 UG/1
2 INHALANT RESPIRATORY (INHALATION) EVERY 6 HOURS PRN
Qty: 18 G | Refills: 1 | Status: SHIPPED | OUTPATIENT
Start: 2024-10-30 | End: 2024-10-30

## 2024-10-30 RX ORDER — MONTELUKAST SODIUM 10 MG/1
10 TABLET ORAL NIGHTLY
Qty: 90 TABLET | Refills: 3 | Status: SHIPPED | OUTPATIENT
Start: 2024-10-30

## 2024-10-30 RX ORDER — FUROSEMIDE 20 MG/1
20 TABLET ORAL DAILY
Qty: 90 TABLET | Refills: 3 | Status: SHIPPED | OUTPATIENT
Start: 2024-10-30 | End: 2024-10-30

## 2024-10-30 RX ORDER — ALBUTEROL SULFATE 90 UG/1
2 INHALANT RESPIRATORY (INHALATION) EVERY 6 HOURS PRN
Qty: 18 G | Refills: 1 | Status: SHIPPED | OUTPATIENT
Start: 2024-10-30

## 2024-10-30 RX ORDER — AMLODIPINE BESYLATE 2.5 MG/1
2.5 TABLET ORAL DAILY
Qty: 90 TABLET | Refills: 3 | Status: SHIPPED | OUTPATIENT
Start: 2024-10-30 | End: 2024-10-30

## 2024-10-30 RX ORDER — LOSARTAN POTASSIUM 100 MG/1
100 TABLET ORAL DAILY
Qty: 100 TABLET | Refills: 3 | Status: SHIPPED | OUTPATIENT
Start: 2024-10-30 | End: 2024-10-30

## 2024-10-30 RX ORDER — AMLODIPINE BESYLATE 2.5 MG/1
2.5 TABLET ORAL DAILY
Qty: 90 TABLET | Refills: 3 | Status: SHIPPED | OUTPATIENT
Start: 2024-10-30 | End: 2025-10-25

## 2024-10-30 RX ORDER — BUPROPION HYDROCHLORIDE 150 MG/1
150 TABLET ORAL DAILY
Qty: 90 TABLET | Refills: 3 | Status: SHIPPED | OUTPATIENT
Start: 2024-10-30

## 2024-10-30 RX ORDER — POTASSIUM CHLORIDE 750 MG/1
10 TABLET, FILM COATED, EXTENDED RELEASE ORAL DAILY
Qty: 90 TABLET | Refills: 3 | Status: SHIPPED | OUTPATIENT
Start: 2024-10-30 | End: 2025-10-30

## 2024-10-30 RX ORDER — OMEPRAZOLE 20 MG/1
20 TABLET, DELAYED RELEASE ORAL DAILY
Qty: 90 TABLET | Refills: 3 | Status: SHIPPED | OUTPATIENT
Start: 2024-10-30

## 2024-10-30 RX ORDER — MONTELUKAST SODIUM 10 MG/1
10 TABLET ORAL NIGHTLY
Qty: 90 TABLET | Refills: 3 | Status: SHIPPED | OUTPATIENT
Start: 2024-10-30 | End: 2024-10-30

## 2024-10-30 RX ORDER — POTASSIUM CHLORIDE 750 MG/1
10 TABLET, FILM COATED, EXTENDED RELEASE ORAL DAILY
Qty: 90 TABLET | Refills: 3 | Status: SHIPPED | OUTPATIENT
Start: 2024-10-30 | End: 2024-10-30

## 2024-11-06 NOTE — PROGRESS NOTES
SUBJECTIVE:  This is 78 y.o.  female with PMH of (patient of my brother) with a history of anxiety/depression, fatigue, history of L4-S1 fusion with disease at the L3-4 and significant spondylosis and endplate changes at L1-2 and also has another problem with cervical spondylosis and radiculopathy received on 9/9/2020 for bilateral L3-4 TFESI who is here for follow-up stating that the injection has helped tremendously for her her pain has decreased at least by 65% and she feels really very good.  Her right hip is going away and I explained to her that if I do injection her right hip she would be able to replace it for 3 months.  The patient is going to meet with the orthopedic surgeon at the [facility] and then she will contact me if she wants to do injection or she is going to go for surgery.  Will plan on repeat bilateral L3-4 TFESI when patient calls to help her with the junctional disease above the fusion.      Prior office visit:  8/2/2024: This is 78 y.o.  female with PMH of (patient of my brother) with a history of anxiety/depression, fatigue, history of L4-S1 fusion with disease at the L3-4 and significant spondylosis and endplate changes at L1-2 and also has another problem with cervical spondylosis and radiculopathy.  Patient responded very well to right L3-4 TFESI with 90% improvement and bilateral C6-7 ADONIS last injection 11/13/2023 with 80% improvement.  The patient also takes tramadol 4 times daily as needed from us who is here for follow-up who is complaining mostly of lower back pain correlate with her L3-4 disease above the fusion in addition to right gluteal area that she uses the sitting pad for it and that is helping partially.  The patient responds to tramadol very well with her therapy but she does not like to take it much and now she is thinking of taking it twice a day which I agree with her.  We are going to proceed with bilateral L3-4 TFESI above the fusion.  She may need caudal ADONIS  for her gluteal pain since she has fusion extended to the S1-S2       Last procedure:   9/9/2024 bilateral L3-4 TFESI patient has had 60% improvement in pain and function for 6 weeks and still going  11/13/2023 bilateral C6-7 cervical ADONIS patient has had 80% improvement in pain and function  9/11/2023 right L3-4 TFESI  the patient has had a 90% improvement in pain and function      Portions of record reviewed for pertinent issues: active problem list, medication list, allergies, family history, social history, notes from last encounter, encounters, lab results, imaging and other system records.        I have personally reviewed the OARRS report for this patient. This report is scanned into the electronic medical record. I have considered the risks of abuse, dependence, addiction and diversion. It showed tramadol 1-2 times daily from us  OPIOID RISK ASSESSMENT SCORE 2/26  Opioid agreement: 8/2/2024  Activities of daily living: Very active doing aquatic therapy 3 times a week  Adverse effects: None  Analgesia: W/O 8/10, W 3/10 takes 1 or 2 tramadol per day as needed  Toxicology screen 8/2/2024  Aberrant behavior: None  Patient is being treated with tramadol therapy for pain and is responding appropriately.  There are NO signs of tramadol intoxication, abuse, addiction or withdrawal. Pupils are equal, reactive to light bilateral, appropriate speech and cognition. Patient denies any opioid induced constipation. The OARRS registry followed periodically, urine toxicology completed and appropriate.      Patient is advised and warned in specific detail about potential benefits of opioids along with risks and side effects including, but not limited to, dependency, addiction, tolerance, hyperalgesia, anxiety, depression, insomnia, endocrine changes, immunologic disturbances, respiratory depression and death.      Caution advised regarding the use of medications prescribed at this office and specific mention made regarding not  to drive or operate heavy machinery if feeling side effects from this the medications. Patient expressed an understanding in regards to these particular concerns.      Discussed non-opioid options including (But no limited), physical wellness, antiinflammatory diet, icing and heating, meditation, relaxation, massage and acupuncture.     We will continue to monitor and adjust medications as needed.           Employment/pending law suits: Used to work for a [employer] company for many years  Social History: Single now lives in [place] [facility] her friend is a patient of mine, no children, finished masters degree in counseling, denies smoking drinking or use of illicit drugs                   Diagnostic studies:  11/10/2022 MRI of the lumbar spine showed L4-S1 fusion with disease above the fusion at the L3-4 in addition to disc herniation at the L2-3. No bone marrow edema and no endplate changes report was scanned into the system              Review of Systems   HENT: Negative.     Eyes: Negative.    Respiratory: Negative.     Cardiovascular: Negative.    Gastrointestinal: Negative.    Endocrine: Negative.    Genitourinary: Negative.    Musculoskeletal:  Positive for arthralgias, back pain, myalgias and neck pain.   Skin: Negative.    Neurological: Negative.    Hematological: Negative.    Psychiatric/Behavioral: Negative.           Physical Exam  Vitals and nursing note reviewed.   Constitutional:       Appearance: Normal appearance.   HENT:      Head: Normocephalic and atraumatic.      Nose: Nose normal.   Eyes:      Extraocular Movements: Extraocular movements intact.      Conjunctiva/sclera: Conjunctivae normal.      Pupils: Pupils are equal, round, and reactive to light.   Cardiovascular:      Rate and Rhythm: Normal rate and regular rhythm.      Pulses: Normal pulses.      Heart sounds: Normal heart sounds.   Pulmonary:      Effort: Pulmonary effort is normal.      Breath sounds: Normal breath sounds.    Abdominal:      General: Abdomen is flat. Bowel sounds are normal.      Palpations: Abdomen is soft.   Musculoskeletal:         General: Tenderness present.   Skin:     General: Skin is warm.   Neurological:      General: No focal deficit present.      Mental Status: She is alert and oriented to person, place, and time.   Psychiatric:         Mood and Affect: Mood normal.         Behavior: Behavior normal.            Plan  At least 50% of the visit was involved in the discussion of the options for treatment. We discussed exercises, medication, interventional therapies and surgery. Healthy life style is essential with patient hard work to achieve the wellness. In addition; discussion with the patient and/or family about any of the diagnostic results, impressions and/or recommended diagnostic studies, prognosis, risks and benefits of treatment options, instructions for treatment and/or follow-up, importance of compliance with chosen treatment options, risk-factor reduction, and patient/family education.         Recommended Pool therapy, walking in the pool, at least 3x per week for 30 minutes  Continue self-directed physical therapy with at least daily exercises for minimum of 20-minute, brochure was handed to the patient  The patient had bad right hip we may do hip injection if she is not getting DANIELLE from her surgeon at [facility] in the near future  Repeat bilateral L3-4 TFESI above the fusion when patient calls  Continue tramadol the patient is doing great with the medication and helping her keep active and functional  Healthy lifestyle and anti-inflammatory diet in addition to weight control discussed with the patient  Alternative chronic pain therapies was discussed, encouraged and information was handed  Return to Clinic 3 months     *Please note this report has been produced using speech recognition software and may contain errors related to that system including grammar, punctuation and spelling as well as words  and phrases that may be inappropriate. If there are questions or concerns, please feel free to contact me to clarify.    Rey Adair MD

## 2024-11-07 ENCOUNTER — APPOINTMENT (OUTPATIENT)
Dept: PAIN MEDICINE | Facility: CLINIC | Age: 79
End: 2024-11-07
Payer: MEDICARE

## 2024-11-07 VITALS
HEIGHT: 68 IN | OXYGEN SATURATION: 94 % | WEIGHT: 190 LBS | BODY MASS INDEX: 28.79 KG/M2 | RESPIRATION RATE: 16 BRPM | DIASTOLIC BLOOD PRESSURE: 62 MMHG | TEMPERATURE: 97.7 F | SYSTOLIC BLOOD PRESSURE: 113 MMHG | HEART RATE: 72 BPM

## 2024-11-07 DIAGNOSIS — M96.1 LUMBAR POSTLAMINECTOMY SYNDROME: ICD-10-CM

## 2024-11-07 DIAGNOSIS — M54.16 LUMBAR RADICULOPATHY: Primary | ICD-10-CM

## 2024-11-07 PROCEDURE — 1123F ACP DISCUSS/DSCN MKR DOCD: CPT | Performed by: ANESTHESIOLOGY

## 2024-11-07 PROCEDURE — 99214 OFFICE O/P EST MOD 30 MIN: CPT | Performed by: ANESTHESIOLOGY

## 2024-11-07 PROCEDURE — 1157F ADVNC CARE PLAN IN RCRD: CPT | Performed by: ANESTHESIOLOGY

## 2024-11-07 PROCEDURE — 1036F TOBACCO NON-USER: CPT | Performed by: ANESTHESIOLOGY

## 2024-11-07 PROCEDURE — 3074F SYST BP LT 130 MM HG: CPT | Performed by: ANESTHESIOLOGY

## 2024-11-07 PROCEDURE — 3078F DIAST BP <80 MM HG: CPT | Performed by: ANESTHESIOLOGY

## 2024-11-07 PROCEDURE — 1159F MED LIST DOCD IN RCRD: CPT | Performed by: ANESTHESIOLOGY

## 2024-11-07 PROCEDURE — 1125F AMNT PAIN NOTED PAIN PRSNT: CPT | Performed by: ANESTHESIOLOGY

## 2024-11-07 SDOH — ECONOMIC STABILITY: FOOD INSECURITY: WITHIN THE PAST 12 MONTHS, YOU WORRIED THAT YOUR FOOD WOULD RUN OUT BEFORE YOU GOT MONEY TO BUY MORE.: NEVER TRUE

## 2024-11-07 SDOH — ECONOMIC STABILITY: FOOD INSECURITY: WITHIN THE PAST 12 MONTHS, THE FOOD YOU BOUGHT JUST DIDN'T LAST AND YOU DIDN'T HAVE MONEY TO GET MORE.: NEVER TRUE

## 2024-11-07 ASSESSMENT — LIFESTYLE VARIABLES
SKIP TO QUESTIONS 9-10: 1
HOW OFTEN DURING THE LAST YEAR HAVE YOU FAILED TO DO WHAT WAS NORMALLY EXPECTED FROM YOU BECAUSE OF DRINKING: NEVER
AUDIT-C TOTAL SCORE: 4
HOW OFTEN DURING THE LAST YEAR HAVE YOU NEEDED AN ALCOHOLIC DRINK FIRST THING IN THE MORNING TO GET YOURSELF GOING AFTER A NIGHT OF HEAVY DRINKING: NEVER
HOW OFTEN DURING THE LAST YEAR HAVE YOU BEEN UNABLE TO REMEMBER WHAT HAPPENED THE NIGHT BEFORE BECAUSE YOU HAD BEEN DRINKING: NEVER
AUDIT TOTAL SCORE: 4
HAVE YOU OR SOMEONE ELSE BEEN INJURED AS A RESULT OF YOUR DRINKING: NO
TOTAL SCORE: 1
HOW OFTEN DURING THE LAST YEAR HAVE YOU HAD A FEELING OF GUILT OR REMORSE AFTER DRINKING: NEVER
HAS A RELATIVE, FRIEND, DOCTOR, OR ANOTHER HEALTH PROFESSIONAL EXPRESSED CONCERN ABOUT YOUR DRINKING OR SUGGESTED YOU CUT DOWN: NO
HOW MANY STANDARD DRINKS CONTAINING ALCOHOL DO YOU HAVE ON A TYPICAL DAY: 1 OR 2
HOW OFTEN DO YOU HAVE A DRINK CONTAINING ALCOHOL: 4 OR MORE TIMES A WEEK
HOW OFTEN DO YOU HAVE SIX OR MORE DRINKS ON ONE OCCASION: NEVER

## 2024-11-07 ASSESSMENT — ENCOUNTER SYMPTOMS
DEPRESSION: 1
OCCASIONAL FEELINGS OF UNSTEADINESS: 1
LOSS OF SENSATION IN FEET: 1

## 2024-11-07 ASSESSMENT — PAIN SCALES - GENERAL
PAINLEVEL_OUTOF10: 4
PAINLEVEL_OUTOF10: 4

## 2024-11-07 ASSESSMENT — PATIENT HEALTH QUESTIONNAIRE - PHQ9
1. LITTLE INTEREST OR PLEASURE IN DOING THINGS: NOT AT ALL
2. FEELING DOWN, DEPRESSED OR HOPELESS: NOT AT ALL
SUM OF ALL RESPONSES TO PHQ9 QUESTIONS 1 AND 2: 0

## 2024-11-07 ASSESSMENT — PAIN - FUNCTIONAL ASSESSMENT: PAIN_FUNCTIONAL_ASSESSMENT: 0-10

## 2024-11-07 ASSESSMENT — PAIN DESCRIPTION - DESCRIPTORS: DESCRIPTORS: STABBING;SHARP

## 2024-11-07 NOTE — PROGRESS NOTES
This is 78 y.o.  female with who has been treated for Lower back pain. Patient had bilateralL3-L4  Lumbar transforaminal epidural steroid injection 9/9/2024, Pain is 50 -60 % better, The pain is described as sharp and stabbing left leg , left wrist right sit bone, right hip and is relieved by Medications tramadol gives her 40 -50 % relief    Here for follow-up   Chief Complaint   Patient presents with    Follow-up     bilateralL3-L4  Lumbar transforaminal epidural steroid injection 9/9/2024       Pain Therapies: Injections and tramadol       [place] Each Box that Applies Female Male   FAMILY HISTORY OF SUBSTANCE ABUSE  [place] the boxes that applies   Alcohol ?  1    ? 3   Illegal drugs ?  2 ? 3   Rx drugs ?  4 ? 4   PERSONAL HISTORY OF SUBSTNACE ABUSE   Alcohol ?  3 ?  3   Illegal drugs ?  4 ?  4    Rx drugs ?  5 ?  5   Age Between 16-45 years ?  1 ?  1   History of Preadolescent Sexual Abuse ?  3 ?  0   PSYCHOLOGIC DISEASE   ADD, OCD, bipolar, schizophrenia ?  2 ?  2   Depression x  1 ?  1   Scoring Totals 1      Scoring (Risk)  0-3 - Low  4-7 - Moderate  8 - High    Opioid Risk Assessment Score 1/26

## 2024-11-27 ENCOUNTER — APPOINTMENT (OUTPATIENT)
Dept: RADIOLOGY | Facility: CLINIC | Age: 79
End: 2024-11-27
Payer: MEDICARE

## 2024-12-03 ENCOUNTER — HOSPITAL ENCOUNTER (OUTPATIENT)
Dept: RADIOLOGY | Facility: CLINIC | Age: 79
Discharge: HOME | End: 2024-12-03
Payer: MEDICARE

## 2024-12-03 DIAGNOSIS — R16.0 LIVER MASS: ICD-10-CM

## 2024-12-03 PROCEDURE — 74181 MRI ABDOMEN W/O CONTRAST: CPT

## 2024-12-13 DIAGNOSIS — F51.01 PRIMARY INSOMNIA: ICD-10-CM

## 2024-12-16 RX ORDER — TRAZODONE HYDROCHLORIDE 50 MG/1
TABLET ORAL
Qty: 45 TABLET | Refills: 3 | Status: SHIPPED | OUTPATIENT
Start: 2024-12-16

## 2025-01-05 DIAGNOSIS — J45.909 UNCOMPLICATED ASTHMA, UNSPECIFIED ASTHMA SEVERITY, UNSPECIFIED WHETHER PERSISTENT (HHS-HCC): ICD-10-CM

## 2025-01-07 RX ORDER — ALBUTEROL SULFATE 90 UG/1
2 INHALANT RESPIRATORY (INHALATION) EVERY 6 HOURS PRN
Qty: 34 G | Refills: 3 | OUTPATIENT
Start: 2025-01-07

## 2025-03-07 ENCOUNTER — TELEPHONE (OUTPATIENT)
Dept: PRIMARY CARE | Facility: CLINIC | Age: 80
End: 2025-03-07
Payer: MEDICARE

## 2025-03-07 DIAGNOSIS — J45.909 ASTHMA, UNSPECIFIED ASTHMA SEVERITY, UNSPECIFIED WHETHER COMPLICATED, UNSPECIFIED WHETHER PERSISTENT (HHS-HCC): ICD-10-CM

## 2025-03-07 NOTE — TELEPHONE ENCOUNTER
NT PT DG is covering.    Pt is calling in regards to her Wixela. Pt states that she takes this medication everyday. For some reason the script was discontinued and the reason was patient wasn't taking the medication. Pt is asking if this can be resent in to the [facility] in [place]?       Medication:     Fluticasone Propion-salmeterol (Advair Diskus) 500-50 MCG/dose diskus inhaler; Inhale 1 puff 2 times a day. Rinse mouth with water after use to reduce aftertaste and incidence of candidiasis. Do not swallow.     Pharmacy: [facility]   Phone Number: [phone]

## 2025-03-09 RX ORDER — FLUTICASONE PROPIONATE AND SALMETEROL 500; 50 UG/1; UG/1
1 POWDER RESPIRATORY (INHALATION)
Qty: 60 EACH | Refills: 2 | Status: SHIPPED | OUTPATIENT
Start: 2025-03-09 | End: 2026-03-09

## 2025-04-26 ENCOUNTER — OFFICE VISIT (OUTPATIENT)
Dept: URGENT CARE | Age: 80
End: 2025-04-26
Payer: MEDICARE

## 2025-04-26 VITALS
BODY MASS INDEX: 29.4 KG/M2 | SYSTOLIC BLOOD PRESSURE: 158 MMHG | OXYGEN SATURATION: 95 % | TEMPERATURE: 98.9 F | HEIGHT: 68 IN | HEART RATE: 74 BPM | WEIGHT: 194 LBS | RESPIRATION RATE: 20 BRPM | DIASTOLIC BLOOD PRESSURE: 69 MMHG

## 2025-04-26 DIAGNOSIS — J06.9 VIRAL URI: ICD-10-CM

## 2025-04-26 LAB
POC CORONAVIRUS SARS-COV-2 PCR: NEGATIVE
POC HUMAN RHINOVIRUS PCR: POSITIVE
POC INFLUENZA A VIRUS PCR: NEGATIVE
POC INFLUENZA B VIRUS PCR: NEGATIVE
POC RESPIRATORY SYNCYTIAL VIRUS PCR: NEGATIVE

## 2025-04-26 RX ORDER — METHYLPREDNISOLONE 4 MG/1
TABLET ORAL
Qty: 21 TABLET | Refills: 0 | Status: SHIPPED | OUTPATIENT
Start: 2025-04-26

## 2025-04-26 ASSESSMENT — PATIENT HEALTH QUESTIONNAIRE - PHQ9
1. LITTLE INTEREST OR PLEASURE IN DOING THINGS: NOT AT ALL
SUM OF ALL RESPONSES TO PHQ9 QUESTIONS 1 AND 2: 0
2. FEELING DOWN, DEPRESSED OR HOPELESS: NOT AT ALL

## 2025-04-26 ASSESSMENT — ENCOUNTER SYMPTOMS
LOSS OF SENSATION IN FEET: 0
DEPRESSION: 0
OCCASIONAL FEELINGS OF UNSTEADINESS: 0

## 2025-04-26 NOTE — PROGRESS NOTES
"Subjective   Patient ID: Carmen Bauman is a 79 y.o. female. They present today with a chief complaint of Sinusitis (Since Monday /All in the head and chest congestions ).    Patient disposition: Home    History of Present Illness  HPI  4-5 days of congestion, runny nose, sinus pressure, headache.  Some chest congestion.  Patient with history of asthma.  Takes Flovent inhaler regularly.  Now feeling sinuses.  No fever or chills.  No sick contacts but does live at assisted living.  No GI symptoms.  No ear pain or pressure.  Has been using Froy-Synephrine spray.  No other complaints or symptoms      Past Medical History  Allergies as of 04/26/2025 - Reviewed 04/26/2025   Allergen Reaction Noted    Bactrim [sulfamethoxazole-trimethoprim] Hives 07/12/2024    House dust Unknown 08/11/2022    Mold Unknown 08/11/2022    Pollen extracts Unknown 01/13/2022    Clarithromycin Hives and Unknown 02/16/2018    Clavulanic acid Unknown 08/11/2022       Prescriptions Prior to Admission[1]     Current Medications[2]    Problem List[3]    Surgical History[4]     reports that she has quit smoking. Her smoking use included cigarettes. She has a 1.3 pack-year smoking history. She has never been exposed to tobacco smoke. She has never used smokeless tobacco. She reports current alcohol use of about 5.0 - 7.0 standard drinks of alcohol per week. She reports that she does not use drugs.    Review of Systems  As noted in HPI. ROS otherwise negative unless noted.       Objective    Vitals:    04/26/25 1539   BP: 158/69   Pulse: 74   Resp: 20   Temp: 37.2 °C (98.9 °F)   SpO2: 95%   Weight: 88 kg (194 lb)   Height: 1.727 m (5' 8\")     No LMP recorded (lmp unknown). Patient is postmenopausal.    Physical Exam  Constitutional: vital signs reviewed. Well developed, well nourished. patient alert and patient without distress.   Head and Face: Normal and atraumatic.  Palpation of the face and sinuses: Normal.  Ears, Nose, Mouth, and Throat:   " Hearing: Normal.  External inspection of nose: Normal.   Lips, teeth, tongue and gums: Normal and well hydrated. External inspection of ears: Normal. Ear canals and TMs: Normal.  Posterior pharynx moist, no exudate, symmetric, no abscess, and with post nasal drip.  Nasal mucosa: Congested  Neck: No neck mass was observed. Supple. normal muscle tone.   Cardiovascular: Heart rate normal, normal S1 and S2, no gallops, no murmurs and no pericardial rub. Rhythm: Normal.  Pulmonary: No respiratory distress. Palpation of chest: Normal. Clear bilateral breath sounds.   Lymphatic: No cervical lymphadenopathy  Psych: Normal mood and affect        Procedures    Point of Care Test & Imaging Results from this visit  Results for orders placed or performed in visit on 08/02/24   Opiate Confirmation, Urine    Collection Time: 08/02/24  3:34 PM   Result Value Ref Range    6-Acetylmorphine <25 <25 ng/mL    Codeine <50 <50 ng/mL    Hydrocodone <25 <25 ng/mL    Hydromorphone <25 <25 ng/mL    Morphine  <50 <50 ng/mL    Norhydrocodone <25 <25 ng/mL    Noroxycodone <25 <25 ng/mL    Oxycodone <25 <25 ng/mL    Oxymorphone <25 <25 ng/mL   Screen Opiate/Opioid/Benzo Prescription Compliance    Collection Time: 08/02/24  3:34 PM   Result Value Ref Range    Creatinine, Urine Random 54.0 20.0 - 320.0 mg/dL    Amphetamine Screen, Urine Presumptive Negative Presumptive Negative    Barbiturate Screen, Urine Presumptive Negative Presumptive Negative    Cannabinoid Screen, Urine Presumptive Negative Presumptive Negative    Cocaine Metabolite Screen, Urine Presumptive Negative Presumptive Negative    PCP Screen, Urine Presumptive Negative Presumptive Negative            Diagnostic study results (if any) were reviewed.  (If applicable) preliminary radiology reading: None    Assessment/Plan   Allergies, medications, history, and pertinent labs/EKGs/Imaging reviewed.        Medical Decision Making  See note    Orders and Diagnoses  Diagnoses and all  orders for this visit:  Sinus complaint  -     POCT SPOTFIRE R/ST Panel Mini w/COVID ([facility]) manually resulted      Medical Admin Record      Follow Up Instructions  No follow-ups on file.    At time of discharge patient was clinically well-appearing and HDS for outpatient management. The patient and/or family was educated regarding diagnosis, supportive care, OTC and Rx medications. The patient and/or family was given the opportunity to ask questions prior to discharge and all questions answered. They verbalized understanding of my discussion of the plans for treatment, expected course, indications to return to  or seek further evaluation in ED, and the need for timely follow up as directed.      Electronically signed by [facility]           [1] (Not in a hospital admission)   [2]   Current Outpatient Medications   Medication Sig Dispense Refill    albuterol 90 mcg/actuation inhaler Inhale 2 puffs every 6 hours if needed for wheezing or shortness of breath. 18 g 1    amLODIPine (Norvasc) 2.5 mg tablet Take 1 tablet (2.5 mg) by mouth once daily. 90 tablet 3    aspirin 81 mg EC tablet Take 1 tablet (81 mg) by mouth once daily.      atorvastatin (Lipitor) 10 mg tablet Take 1 tablet (10 mg) by mouth once daily. 90 tablet 3    b complex 0.4 mg tablet Take 1 tablet by mouth once daily.      buPROPion XL (Wellbutrin XL) 150 mg 24 hr tablet Take 1 tablet (150 mg) by mouth once daily. Do not crush, chew, or split. 90 tablet 3    busPIRone (Buspar) 15 mg tablet Take 1 tablet (15 mg) by mouth 2 times a day as needed (anxiety). 90 tablet 3    cholecalciferol (Vitamin D-3) 25 MCG (1000 UT) capsule Take 1 capsule (25 mcg) by mouth once daily.      cyanocobalamin, vitamin B-12, (VITAMIN B-12 ORAL) Take 1 tablet by mouth once daily.      fluticasone propion-salmeteroL (Advair Diskus) 500-50 mcg/dose diskus inhaler Inhale 1 puff 2 times a day. Rinse mouth with water after use to reduce aftertaste and incidence of  candidiasis. Do not swallow. 60 each 2    furosemide (Lasix) 20 mg tablet Take 1 tablet (20 mg) by mouth once daily. 90 tablet 3    ketoprofen, bulk, 100 % powder Ketoprofen 10% + gabapentin 10% + lidocaine 4% total volume 40 mL apply thin layer over the affected area and rub vigorously 3 times daily as needed, dispense 1 with 5 refills 40 g 11    krill oil 500 mg capsule Take by mouth.      losartan (Cozaar) 100 mg tablet Take 1 tablet (100 mg) by mouth once daily. 100 tablet 3    magnesium oxide (Mag-Ox) 400 mg (241.3 mg magnesium) tablet Take 1 tablet (400 mg) by mouth once daily.      metoprolol succinate XL (Toprol-XL) 50 mg 24 hr tablet Take 1 tablet (50 mg) by mouth once daily. 90 tablet 3    montelukast (Singulair) 10 mg tablet Take 1 tablet (10 mg) by mouth once daily at bedtime. 90 tablet 3    omeprazole OTC (PriLOSEC OTC) 20 mg EC tablet Take 1 tablet (20 mg) by mouth once daily. 90 tablet 3    potassium chloride CR (Klor-Con) 10 mEq ER tablet Take 1 tablet (10 mEq) by mouth once daily. Do not crush, chew, or split. 90 tablet 3    sertraline (Zoloft) 50 mg tablet Take 1 tablet (50 mg) by mouth once daily. 90 tablet 3    traMADol (Ultram) 50 mg tablet Take 1 tablet (50 mg) by mouth every 6 hours if needed for severe pain (7 - 10). 120 tablet 2    traZODone (Desyrel) 50 mg tablet TAKE ONE-HALF TABLET BY MOUTH  ONCE DAILY AT BEDTIME 45 tablet 3     No current facility-administered medications for this visit.   [3]   Patient Active Problem List  Diagnosis    Anxiety disorder, unspecified    Moderate persistent asthma without complication ([facility])    Back pain    Essential (primary) hypertension    Colonic polyp    Lumbar spondylosis with myelopathy    Degenerative joint disease, shoulder, right    Gastro-esophageal reflux disease without esophagitis    History of total right knee replacement    Status post left knee replacement    Hyperlipidemia, unspecified    Iron deficiency anemia    Lumbar  postlaminectomy syndrome    Major depressive disorder, recurrent    Degenerative joint disease (DJD) of hip    Unspecified osteoarthritis, unspecified site    Osteoporosis, senile    Presence of left artificial hip joint    Palpitations    Polyarthralgia    Polyneuropathy, peripheral sensorimotor axonal    Primary osteoarthritis of both knees    Pseudophakia of left eye    Pseudophakia, right eye    Spondylolisthesis at L4-L5 level    Vitamin D deficiency, unspecified    Muscle weakness (generalized)    Unspecified lack of coordination    House dust mite allergy    Neoplasm of unspecified behavior of bone, soft tissue, and skin    Sneezing with watery eyes    Difficulty in walking, not elsewhere classified    Hypertensive chronic kidney disease with stage 1 through stage 4 chronic kidney disease, or unspecified chronic kidney disease    Pain in right shoulder    Chronic pain syndrome    Routine general medical examination at health care facility   [4]   Past Surgical History:  Procedure Laterality Date    COLONOSCOPY  12/07/2020    Colonoscopy (Fiberoptic)    DILATION AND CURETTAGE OF UTERUS  03/28/2014    Dilation And Curettage    OTHER SURGICAL HISTORY  03/28/2014    Hysteroscopy    OTHER SURGICAL HISTORY  08/19/2022    Knee replacement

## 2025-04-26 NOTE — PATIENT INSTRUCTIONS
A rapid PCR test was performed today including tests for Influenza A, influenza B, RSV, rhinovirus (common cold virus), Covid-19.  The results were: Positive for rhinovirus    You have an upper respiratory infection. Mostly, these are caused by viruses and treatment is as follows. Symptoms may last for up to 1-2 weeks, but follow up with PCP if your symptoms start worsening.  For muscle aches, fever, chills: Acetaminophen or Ibuprofen, Advil, or Aleve can be used.  Hydration: Maintain adequate hydration with water.  Nasal symptoms: Nasal Saline available over-the-counter, can be used 3-4 times per day  Decongestants reduce nasal congestion and discharge  Vaseline at opening of nares may reduce irritation   Cool Mist Humidifier may loosens discharge  Cough Suppressants or expectorants may be taken over-the-counter     Antibiotics are not indicated for treatment, as antibiotics do not treat viruses.      Use a steroid anti-inflammatory pack as directed

## 2025-05-02 ENCOUNTER — OFFICE VISIT (OUTPATIENT)
Dept: URGENT CARE | Age: 80
End: 2025-05-02
Payer: MEDICARE

## 2025-05-02 VITALS
OXYGEN SATURATION: 96 % | BODY MASS INDEX: 29.4 KG/M2 | HEIGHT: 68 IN | DIASTOLIC BLOOD PRESSURE: 82 MMHG | TEMPERATURE: 99.2 F | SYSTOLIC BLOOD PRESSURE: 145 MMHG | WEIGHT: 194 LBS | RESPIRATION RATE: 16 BRPM | HEART RATE: 76 BPM

## 2025-05-02 DIAGNOSIS — R05.1 ACUTE COUGH: ICD-10-CM

## 2025-05-02 DIAGNOSIS — J40 SINOBRONCHITIS: Primary | ICD-10-CM

## 2025-05-02 DIAGNOSIS — J32.9 SINOBRONCHITIS: Primary | ICD-10-CM

## 2025-05-02 PROCEDURE — 1159F MED LIST DOCD IN RCRD: CPT | Performed by: FAMILY MEDICINE

## 2025-05-02 PROCEDURE — 1123F ACP DISCUSS/DSCN MKR DOCD: CPT | Performed by: FAMILY MEDICINE

## 2025-05-02 PROCEDURE — 3079F DIAST BP 80-89 MM HG: CPT | Performed by: FAMILY MEDICINE

## 2025-05-02 PROCEDURE — 1157F ADVNC CARE PLAN IN RCRD: CPT | Performed by: FAMILY MEDICINE

## 2025-05-02 PROCEDURE — 1126F AMNT PAIN NOTED NONE PRSNT: CPT | Performed by: FAMILY MEDICINE

## 2025-05-02 PROCEDURE — 3077F SYST BP >= 140 MM HG: CPT | Performed by: FAMILY MEDICINE

## 2025-05-02 PROCEDURE — 1036F TOBACCO NON-USER: CPT | Performed by: FAMILY MEDICINE

## 2025-05-02 PROCEDURE — 99213 OFFICE O/P EST LOW 20 MIN: CPT | Performed by: FAMILY MEDICINE

## 2025-05-02 RX ORDER — BENZONATATE 200 MG/1
200 CAPSULE ORAL 3 TIMES DAILY PRN
Qty: 42 CAPSULE | Refills: 0 | Status: SHIPPED | OUTPATIENT
Start: 2025-05-02 | End: 2025-06-01

## 2025-05-02 RX ORDER — AMOXICILLIN 875 MG/1
875 TABLET, FILM COATED ORAL 2 TIMES DAILY
Qty: 20 TABLET | Refills: 0 | Status: SHIPPED | OUTPATIENT
Start: 2025-05-02 | End: 2025-05-12

## 2025-05-02 ASSESSMENT — PAIN SCALES - GENERAL: PAINLEVEL_OUTOF10: 0-NO PAIN

## 2025-05-02 NOTE — PROGRESS NOTES
"Subjective   Patient ID: Carmen Bauman is a 79 y.o. female. They present today with a chief complaint of URI (X 13 days, was seen 4/26 and tested positive for Rhinovirus. Patient still can't stop coughing, completed medrol dosepack).    History of Present Illness  HPI  Patient present with 7-10 days of cough, congestion, postnasal drip runny nose.  Nasal discharge has become more no fevers have been noted. Patient denies shortness of breath, chest pain, or wheezing. No red eyes, eye pain, or discharge. They deny nausea vomiting or diarrhea. No known exposures to strep mono influenza, COVID-19 or pneumonia. No skin rashes are noted. Over-the-counter medications are offering minimal relief from symptoms.  Seen here 4/26 and diagnosed with rhinovirus and given a Medrol pack      Past Medical History  Allergies as of 05/02/2025 - Reviewed 05/02/2025   Allergen Reaction Noted    Bactrim [sulfamethoxazole-trimethoprim] Hives 07/12/2024    House dust Unknown 08/11/2022    Mold Unknown 08/11/2022    Pollen extracts Unknown 01/13/2022    Clarithromycin Hives and Unknown 02/16/2018    Clavulanic acid Unknown 08/11/2022       Prescriptions Prior to Admission[1]     Medical History[2]    Surgical History[3]     reports that she has quit smoking. Her smoking use included cigarettes. She has a 1.3 pack-year smoking history. She has never been exposed to tobacco smoke. She has never used smokeless tobacco. She reports current alcohol use of about 5.0 - 7.0 standard drinks of alcohol per week. She reports that she does not use drugs.    Review of Systems  Review of Systems       As in history of present illness                        Objective    Vitals:    05/02/25 1551   BP: 145/82   Pulse: 76   Resp: 16   Temp: 37.3 °C (99.2 °F)   TempSrc: Oral   SpO2: 96%   Weight: 88 kg (194 lb)   Height: 1.727 m (5' 8\")     No LMP recorded (lmp unknown). Patient is postmenopausal.    Physical Exam  Gen.-alert cooperative and in no apparent " distress  Head and face- no swelling redness, tenderness or rash  Eyes-EOMI, no redness or discharge is noted  ENT- bilateral nasal congestion with clear rhinorrhea and postnasal drip in oral pharynx  Neck- normal range of motion with no lymphadenopathy or mass.   Pulmonary- no respiratory distress noted. Lungs clear to auscultation without wheezes rhonchi or rales  Skin- no rashes discoloration or skin lesions noted  Lymphatic-- no lymph node swelling or tenderness appreciated  Procedures    Point of Care Test & Imaging Results from this visit  No results found for this visit on 05/02/25.   Imaging  No results found.    Cardiology, Vascular, and Other Imaging  No other imaging results found for the past 2 days      Diagnostic study results (if any) were reviewed by Ryland Nair MD.    Assessment/Plan   Allergies, medications, history, and pertinent labs/EKGs/Imaging reviewed by Ryland Nair MD.     Medical Decision Making  At time of discharge patient was clinically well-appearing and HDS for outpatient management. The patient and/or family was educated regarding diagnosis, supportive care, OTC and Rx medications. The patient and/or family was given the opportunity to ask questions prior to discharge.  They verbalized understanding of my discussion of the plans for treatment, expected course, indications to return to  or seek further evaluation in ED, and the need for timely follow up as directed.   They were provided with a work/school excuse if requested.    Orders and Diagnoses  Diagnoses and all orders for this visit:  Sinobronchitis  -     amoxicillin (Amoxil) 875 mg tablet; Take 1 tablet (875 mg) by mouth 2 times a day for 10 days.  Acute cough  -     benzonatate (Tessalon) 200 mg capsule; Take 1 capsule (200 mg) by mouth 3 times a day as needed for cough. Do not crush or chew.      Medical Admin Record      Patient disposition: Home    Electronically signed by Ryland Nair MD  4:09 PM           [1] (Not  in a hospital admission)   [2]   Past Medical History:  Diagnosis Date    Acute bronchitis 01/03/2024    Allergy status to unspecified drugs, medicaments and biological substances     History of seasonal allergies    Anemia 01/03/2024    Chronic kidney disease, stage 3 unspecified ([facility]) 08/02/2019    CKD (chronic kidney disease) stage 3, GFR 30-59 ml/min    Combined forms of age-related cataract, bilateral 02/10/2020    Combined form of age-related cataract, both eyes    Combined forms of age-related cataract, right eye 09/14/2020    Combined form of age-related cataract, right eye    Dysuria 01/03/2024    Edema 01/03/2024    Encounter for other preprocedural examination 07/06/2018    Preoperative clearance    Encounter for screening for malignant neoplasm, site unspecified     Encounter for Pap smear    Essential (primary) hypertension 07/06/2018    Benign essential hypertension    Fatigue 04/11/2023    Hemangioma of skin and subcutaneous tissue 05/27/2021    Hyperlipidemia, unspecified 08/19/2022    Hyperlipidemia    Insomnia 04/11/2023    Osteoarthritis of hip, unspecified 10/13/2021    Degenerative joint disease (DJD) of hip    Other melanin hyperpigmentation 05/27/2021    Other seborrheic keratosis 05/27/2021    Pain following oral surgery 04/11/2023    Pain in unspecified joint 12/18/2019    Polyarthralgia    Pain in unspecified knee 05/23/2016    Joint pain, knee    Pain in unspecified wrist     Wrist pain    Pain in wrist 01/03/2024    Comment on above: noted as right wrist with no other description;    Pain, unspecified 08/11/2022    Personal history of other diseases of the circulatory system 12/11/2020    History of supraventricular tachycardia    Personal history of other diseases of the respiratory system 03/15/2022    History of acute bronchitis    Personal history of other medical treatment     H/O mammogram    Personal history of other specified conditions 07/06/2018    History of palpitations     Personal history of other specified conditions 08/09/2018    History of palpitations    PVCs (premature ventricular contractions) 04/11/2023    Rash and other nonspecific skin eruption 05/27/2021    Superficial foreign body, left foot, initial encounter 07/02/2020    Foreign body in left foot, initial encounter    Superficial foreign body, left foot, initial encounter 07/02/2020    Foreign body in left foot, initial encounter    Supraventricular tachycardia 09/24/2019    Unspecified asthma, uncomplicated ([facility]) 12/17/2021    Asthma    Unspecified asthma, uncomplicated ([facility]) 01/27/2020    AB (asthmatic bronchitis)    Urinary tract infection, site not specified 07/31/2022    Acute UTI (urinary tract infection)    Viral upper respiratory tract infection 01/03/2024   [3]   Past Surgical History:  Procedure Laterality Date    COLONOSCOPY  12/07/2020    Colonoscopy (Fiberoptic)    DILATION AND CURETTAGE OF UTERUS  03/28/2014    Dilation And Curettage    OTHER SURGICAL HISTORY  03/28/2014    Hysteroscopy    OTHER SURGICAL HISTORY  08/19/2022    Knee replacement

## 2025-05-06 ENCOUNTER — APPOINTMENT (OUTPATIENT)
Dept: PRIMARY CARE | Facility: CLINIC | Age: 80
End: 2025-05-06
Payer: MEDICARE

## 2025-05-06 VITALS
WEIGHT: 194 LBS | DIASTOLIC BLOOD PRESSURE: 74 MMHG | SYSTOLIC BLOOD PRESSURE: 122 MMHG | TEMPERATURE: 97.4 F | HEIGHT: 68 IN | BODY MASS INDEX: 29.4 KG/M2

## 2025-05-06 DIAGNOSIS — M54.9 BACK PAIN, UNSPECIFIED BACK LOCATION, UNSPECIFIED BACK PAIN LATERALITY, UNSPECIFIED CHRONICITY: ICD-10-CM

## 2025-05-06 DIAGNOSIS — Z00.00 ROUTINE GENERAL MEDICAL EXAMINATION AT HEALTH CARE FACILITY: Primary | ICD-10-CM

## 2025-05-06 DIAGNOSIS — R05.1 ACUTE COUGH: ICD-10-CM

## 2025-05-06 DIAGNOSIS — R32 URINARY INCONTINENCE, UNSPECIFIED TYPE: ICD-10-CM

## 2025-05-06 DIAGNOSIS — I10 ESSENTIAL (PRIMARY) HYPERTENSION: ICD-10-CM

## 2025-05-06 DIAGNOSIS — M81.0 OSTEOPOROSIS, SENILE: ICD-10-CM

## 2025-05-06 DIAGNOSIS — Z78.0 POST-MENOPAUSAL: ICD-10-CM

## 2025-05-06 PROCEDURE — 1170F FXNL STATUS ASSESSED: CPT | Performed by: FAMILY MEDICINE

## 2025-05-06 PROCEDURE — 3074F SYST BP LT 130 MM HG: CPT | Performed by: FAMILY MEDICINE

## 2025-05-06 PROCEDURE — 1159F MED LIST DOCD IN RCRD: CPT | Performed by: FAMILY MEDICINE

## 2025-05-06 PROCEDURE — 99213 OFFICE O/P EST LOW 20 MIN: CPT | Performed by: FAMILY MEDICINE

## 2025-05-06 PROCEDURE — G0439 PPPS, SUBSEQ VISIT: HCPCS | Performed by: FAMILY MEDICINE

## 2025-05-06 PROCEDURE — 1160F RVW MEDS BY RX/DR IN RCRD: CPT | Performed by: FAMILY MEDICINE

## 2025-05-06 PROCEDURE — 3078F DIAST BP <80 MM HG: CPT | Performed by: FAMILY MEDICINE

## 2025-05-06 ASSESSMENT — ACTIVITIES OF DAILY LIVING (ADL)
MANAGING_FINANCES: INDEPENDENT
GROCERY_SHOPPING: INDEPENDENT
DRESSING: INDEPENDENT
DOING_HOUSEWORK: NEEDS ASSISTANCE
BATHING: INDEPENDENT
TAKING_MEDICATION: INDEPENDENT

## 2025-05-06 ASSESSMENT — PATIENT HEALTH QUESTIONNAIRE - PHQ9
SUM OF ALL RESPONSES TO PHQ9 QUESTIONS 1 AND 2: 2
1. LITTLE INTEREST OR PLEASURE IN DOING THINGS: SEVERAL DAYS
10. IF YOU CHECKED OFF ANY PROBLEMS, HOW DIFFICULT HAVE THESE PROBLEMS MADE IT FOR YOU TO DO YOUR WORK, TAKE CARE OF THINGS AT HOME, OR GET ALONG WITH OTHER PEOPLE: NOT DIFFICULT AT ALL
2. FEELING DOWN, DEPRESSED OR HOPELESS: SEVERAL DAYS

## 2025-05-06 NOTE — PROGRESS NOTES
"Subjective   Reason for Visit: Carmen Bauman is an 79 y.o. female here for a Medicare Wellness visit.     Past Medical, Surgical, and Family History reviewed and updated in chart.         HPI    She was seen in urgent care 2 x for cough which has been present for 4 weeks and is improving. She had significant sinus pressure and drainage and is much improved. No more SOB then normal for her. She has had Prednisone and Amoxicillin. She has taken Benzonatate and Mucinex.     Urinary incontinence worsening over the past year. She has to wear pads.   Denies any frequency, urgency or burning with urination other then when she takes Lasix.     She takes Tramadol 1-2 tablets daily. She has been stable on this dose of the medication. She has been on this dose 7 years or so. She is not interested in doing further injections for her pain. She is wondering if she needs to continue to go to pain management for her Tramadol refills.    Exercise: yoga 2 x weekly, chair exercises 2 x weekly, she is getting ready to put in the garden      Patient Care Team:  Ruba Maher DO as PCP - General     Review of Systems   HENT:  Positive for postnasal drip.    Respiratory:  Positive for cough. Negative for shortness of breath.    Cardiovascular:  Negative for chest pain.   Gastrointestinal:  Negative for abdominal pain.   Genitourinary:         Admits to urinary incontinence        Objective   Vitals:  /74 (BP Location: Right arm, Patient Position: Sitting)   Temp 36.3 °C (97.4 °F)   Ht 1.727 m (5' 8\")   Wt 88 kg (194 lb)   LMP  (LMP Unknown)   BMI 29.50 kg/m²       Physical Exam  Constitutional:       Appearance: Normal appearance.   HENT:      Head: Normocephalic and atraumatic.   Cardiovascular:      Rate and Rhythm: Normal rate and regular rhythm.   Pulmonary:      Effort: Pulmonary effort is normal.   Abdominal:      General: Abdomen is flat. Bowel sounds are normal.      Palpations: Abdomen is soft.   Skin:     " General: Skin is warm and dry.      Capillary Refill: Capillary refill takes less than 2 seconds.   Neurological:      General: No focal deficit present.      Mental Status: She is alert.         Assessment & Plan  Routine general medical examination at health care facility  Last Dexa 10/13/2023, ordered repeat  Pneumococcal series complete after age 65 (Prevnar 13 and Pneumococcal 23)  Shingrix series complete (4/19/2023, 12/20/2022)  RSV vaccination received (11/1/2023)  Last Td 4/6/2018  Last COVID-19 booster 6/11/2024  Last Influenza vaccination 10/15/2024  Orders:    1 Year Follow Up In Primary Care - Wellness Exam    1 Year Follow Up In Primary Care - Wellness Exam; Future    Urinary incontinence, unspecified type  - UA and referral placed for further evaluation  Orders:    Urinalysis with Reflex Microscopic    Referral to Urology; Future    Post-menopausal    Orders:    XR DEXA bone density; Future    Acute cough  - advised to follow up if persists longer then 4 weeks  - continue Benzonatate PRN       Essential (primary) hypertension  - at goal  - continue Losartan- hydrochlorothiazide 100-25 mg PO daily, metoprolol 50 mg PO daily, and Amlodipine 2.5 mg PO daily         Osteoporosis, senile  - dexa ordered         Back pain, unspecified back location, unspecified back pain laterality, unspecified chronicity  - chronic

## 2025-05-06 NOTE — ASSESSMENT & PLAN NOTE
Last Dexa 10/13/2023, ordered repeat  Pneumococcal series complete after age 65 (Prevnar 13 and Pneumococcal 23)  Shingrix series complete (4/19/2023, 12/20/2022)  RSV vaccination received (11/1/2023)  Last Td 4/6/2018  Last COVID-19 booster 6/11/2024  Last Influenza vaccination 10/15/2024  Orders:    1 Year Follow Up In Primary Care - Wellness Exam    1 Year Follow Up In Primary Care - Wellness Exam; Future

## 2025-05-07 ENCOUNTER — PATIENT MESSAGE (OUTPATIENT)
Dept: PRIMARY CARE | Facility: CLINIC | Age: 80
End: 2025-05-07
Payer: MEDICARE

## 2025-05-07 DIAGNOSIS — R35.0 URINARY FREQUENCY: Primary | ICD-10-CM

## 2025-05-07 LAB
APPEARANCE UR: CLEAR
BACTERIA #/AREA URNS HPF: ABNORMAL /HPF
BILIRUB UR QL STRIP: NEGATIVE
COLOR UR: ABNORMAL
GLUCOSE UR QL STRIP: NEGATIVE
HGB UR QL STRIP: NEGATIVE
HYALINE CASTS #/AREA URNS LPF: ABNORMAL /LPF
KETONES UR QL STRIP: ABNORMAL
LEUKOCYTE ESTERASE UR QL STRIP: ABNORMAL
NITRITE UR QL STRIP: NEGATIVE
PH UR STRIP: 6 [PH] (ref 5–8)
PROT UR QL STRIP: NEGATIVE
RBC #/AREA URNS HPF: ABNORMAL /HPF
SERVICE CMNT-IMP: ABNORMAL
SP GR UR STRIP: 1.02 (ref 1–1.03)
SQUAMOUS #/AREA URNS HPF: ABNORMAL /HPF
WBC #/AREA URNS HPF: ABNORMAL /HPF

## 2025-05-08 ASSESSMENT — ENCOUNTER SYMPTOMS
ABDOMINAL PAIN: 0
SHORTNESS OF BREATH: 0
COUGH: 1

## 2025-05-08 NOTE — ASSESSMENT & PLAN NOTE
- at goal  - continue Losartan- hydrochlorothiazide 100-25 mg PO daily, metoprolol 50 mg PO daily, and Amlodipine 2.5 mg PO daily

## 2025-05-11 LAB — BACTERIA UR CULT: NORMAL

## 2025-07-01 ENCOUNTER — TELEPHONE (OUTPATIENT)
Dept: PAIN MEDICINE | Facility: CLINIC | Age: 80
End: 2025-07-01
Payer: MEDICARE

## 2025-07-01 DIAGNOSIS — G89.4 CHRONIC PAIN SYNDROME: ICD-10-CM

## 2025-07-01 RX ORDER — TRAMADOL HYDROCHLORIDE 50 MG/1
50 TABLET, FILM COATED ORAL EVERY 6 HOURS PRN
Qty: 120 TABLET | Refills: 0 | Status: SHIPPED | OUTPATIENT
Start: 2025-07-01

## 2025-07-03 ENCOUNTER — APPOINTMENT (OUTPATIENT)
Dept: UROLOGY | Facility: CLINIC | Age: 80
End: 2025-07-03
Payer: MEDICARE

## 2025-07-03 VITALS — DIASTOLIC BLOOD PRESSURE: 74 MMHG | SYSTOLIC BLOOD PRESSURE: 113 MMHG | HEART RATE: 83 BPM

## 2025-07-03 DIAGNOSIS — R32 URINARY INCONTINENCE, UNSPECIFIED TYPE: ICD-10-CM

## 2025-07-03 LAB
POC APPEARANCE, URINE: CLEAR
POC BILIRUBIN, URINE: NEGATIVE
POC BLOOD, URINE: NEGATIVE
POC COLOR, URINE: YELLOW
POC GLUCOSE, URINE: NEGATIVE MG/DL
POC KETONES, URINE: NEGATIVE MG/DL
POC LEUKOCYTES, URINE: NEGATIVE
POC NITRITE,URINE: NEGATIVE
POC PH, URINE: 6 PH
POC PROTEIN, URINE: NEGATIVE MG/DL
POC SPECIFIC GRAVITY, URINE: <=1.005
POC UROBILINOGEN, URINE: 0.2 EU/DL

## 2025-07-03 PROCEDURE — G2211 COMPLEX E/M VISIT ADD ON: HCPCS | Performed by: NURSE PRACTITIONER

## 2025-07-03 PROCEDURE — 81003 URINALYSIS AUTO W/O SCOPE: CPT | Performed by: NURSE PRACTITIONER

## 2025-07-03 PROCEDURE — 99204 OFFICE O/P NEW MOD 45 MIN: CPT | Performed by: NURSE PRACTITIONER

## 2025-07-03 PROCEDURE — 3078F DIAST BP <80 MM HG: CPT | Performed by: NURSE PRACTITIONER

## 2025-07-03 PROCEDURE — 1159F MED LIST DOCD IN RCRD: CPT | Performed by: NURSE PRACTITIONER

## 2025-07-03 PROCEDURE — 3074F SYST BP LT 130 MM HG: CPT | Performed by: NURSE PRACTITIONER

## 2025-07-03 RX ORDER — VIBEGRON 75 MG/1
75 TABLET, FILM COATED ORAL DAILY
Qty: 28 TABLET | Refills: 0 | COMMUNITY
Start: 2025-07-03 | End: 2025-07-31

## 2025-07-03 NOTE — PROGRESS NOTES
Subjective   Patient ID: Carmen Bauman is a 79 y.o. female who presents for Urine Leakage.  Patient presents to [facility] care for evaluation of urge and stress incontinence. She states she has had symptoms for the last 6 years. She states symptoms are primarily urge in origin.  Sometimes has nocturnal enuresis, NTF up to 2. Occasional dysuria. Feels that she empties well most of the time. Denies need to strain to empty.     Admits to not drinking enough water. Taking furosemide some days, which exacerbates symptoms. Patient does kegel exercises occasionally and yoga. Denies history of hysterectomy. G0, last sexual activity over 25 years ago.     Drinks 24 oz water daily and 2 cups of coffee 1/2 caf in AM, occasional diet pop. 1 glass of wine in evening.         Review of Systems   All other systems reviewed and are negative.      Objective   Physical Exam  Vitals reviewed.     Constitutional: Patient generally appears stated age. Good nutrition. No deformities. Good attention to grooming.  Neck: No neck masses. Good symmetry. No crepitus. Normal thyroid size and consistency with no masses.  Respiratory: Normal respiratory effort. No intercostal retractions. No use of accessory muscles.  Cardiovascular: Examination of the peripheral vascular system reveals no swelling or varicosities with good pulses. Normal extremity temperature, no edema or tenderness.  Abdomen: Examination of the abdomen reveals no masses or tenderness. No hernias detected. Normal liver and spleen size.   Lymphatic: No palpable lymph nodes in the neck, axilla, groin or other locations  Skin: Inspection of the skin reveals no rashes, lesions, ulcers.  Neurologic/Psychiatric: Oriented to time, place and person. Normal mood and affect with no depression, anxiety, or agitation.    Office Visit on 07/03/2025   Component Date Value Ref Range Status    POC Color, Urine 07/03/2025 Yellow  Straw, Yellow, Light-Yellow Final    POC Appearance, Urine  07/03/2025 Clear  Clear Final    POC Glucose, Urine 07/03/2025 NEGATIVE  NEGATIVE mg/dl Final    POC Bilirubin, Urine 07/03/2025 NEGATIVE  NEGATIVE Final    POC Ketones, Urine 07/03/2025 NEGATIVE  NEGATIVE mg/dl Final    POC Specific Gravity, Urine 07/03/2025 <=1.005  1.005 - 1.035 Final    POC Blood, Urine 07/03/2025 NEGATIVE  NEGATIVE Final    POC PH, Urine 07/03/2025 6.0  No Reference Range Established PH Final    POC Protein, Urine 07/03/2025 NEGATIVE  NEGATIVE mg/dl Final    POC Urobilinogen, Urine 07/03/2025 0.2  0.2, 1.0 EU/DL Final    Poc Nitrite, Urine 07/03/2025 NEGATIVE  NEGATIVE Final    POC Leukocytes, Urine 07/03/2025 NEGATIVE  NEGATIVE Final     PVR=0ml    Assessment/Plan   Diagnoses and all orders for this visit:  Urinary incontinence, unspecified type  -     Referral to Urology  -     POCT UA Automated manually resulted  -     Post-Void Residual  -     vibegron (Gemtesa) 75 mg tablet; Take 1 tablet (75 mg) by mouth once daily for 28 days.  -     Referral to Clinical Pharmacy; Future    Discussed urge and stress incontinence. Plan to trial medication for management which she is in agreement with. May consider interventions for CHELSEA pending response.         Paula Bradford, FRANKLYN-CNP 07/03/25 3:43 PM

## 2025-07-03 NOTE — H&P (VIEW-ONLY)
Subjective   Patient ID: Carmen Bauman is a 79 y.o. female who presents for Urine Leakage.  Patient presents to [facility] care for evaluation of urge and stress incontinence. She states she has had symptoms for the last 6 years. She states symptoms are primarily urge in origin.  Sometimes has nocturnal enuresis, NTF up to 2. Occasional dysuria. Feels that she empties well most of the time. Denies need to strain to empty.     Admits to not drinking enough water. Taking furosemide some days, which exacerbates symptoms. Patient does kegel exercises occasionally and yoga. Denies history of hysterectomy. G0, last sexual activity over 25 years ago.     Drinks 24 oz water daily and 2 cups of coffee 1/2 caf in AM, occasional diet pop. 1 glass of wine in evening.         Review of Systems   All other systems reviewed and are negative.      Objective   Physical Exam  Vitals reviewed.     Constitutional: Patient generally appears stated age. Good nutrition. No deformities. Good attention to grooming.  Neck: No neck masses. Good symmetry. No crepitus. Normal thyroid size and consistency with no masses.  Respiratory: Normal respiratory effort. No intercostal retractions. No use of accessory muscles.  Cardiovascular: Examination of the peripheral vascular system reveals no swelling or varicosities with good pulses. Normal extremity temperature, no edema or tenderness.  Abdomen: Examination of the abdomen reveals no masses or tenderness. No hernias detected. Normal liver and spleen size.   Lymphatic: No palpable lymph nodes in the neck, axilla, groin or other locations  Skin: Inspection of the skin reveals no rashes, lesions, ulcers.  Neurologic/Psychiatric: Oriented to time, place and person. Normal mood and affect with no depression, anxiety, or agitation.    Office Visit on 07/03/2025   Component Date Value Ref Range Status    POC Color, Urine 07/03/2025 Yellow  Straw, Yellow, Light-Yellow Final    POC Appearance, Urine  07/03/2025 Clear  Clear Final    POC Glucose, Urine 07/03/2025 NEGATIVE  NEGATIVE mg/dl Final    POC Bilirubin, Urine 07/03/2025 NEGATIVE  NEGATIVE Final    POC Ketones, Urine 07/03/2025 NEGATIVE  NEGATIVE mg/dl Final    POC Specific Gravity, Urine 07/03/2025 <=1.005  1.005 - 1.035 Final    POC Blood, Urine 07/03/2025 NEGATIVE  NEGATIVE Final    POC PH, Urine 07/03/2025 6.0  No Reference Range Established PH Final    POC Protein, Urine 07/03/2025 NEGATIVE  NEGATIVE mg/dl Final    POC Urobilinogen, Urine 07/03/2025 0.2  0.2, 1.0 EU/DL Final    Poc Nitrite, Urine 07/03/2025 NEGATIVE  NEGATIVE Final    POC Leukocytes, Urine 07/03/2025 NEGATIVE  NEGATIVE Final     PVR=0ml    Assessment/Plan   Diagnoses and all orders for this visit:  Urinary incontinence, unspecified type  -     Referral to Urology  -     POCT UA Automated manually resulted  -     Post-Void Residual  -     vibegron (Gemtesa) 75 mg tablet; Take 1 tablet (75 mg) by mouth once daily for 28 days.  -     Referral to Clinical Pharmacy; Future    Discussed urge and stress incontinence. Plan to trial medication for management which she is in agreement with. May consider interventions for CHELSEA pending response.         Paula Bradford, FRANKLYN-CNP 07/03/25 3:43 PM

## 2025-07-15 ENCOUNTER — HOSPITAL ENCOUNTER (OUTPATIENT)
Dept: PAIN MEDICINE | Facility: CLINIC | Age: 80
Discharge: HOME | End: 2025-07-15
Payer: MEDICARE

## 2025-07-15 VITALS
RESPIRATION RATE: 16 BRPM | TEMPERATURE: 99 F | HEART RATE: 68 BPM | OXYGEN SATURATION: 97 % | DIASTOLIC BLOOD PRESSURE: 92 MMHG | WEIGHT: 194 LBS | BODY MASS INDEX: 29.5 KG/M2 | SYSTOLIC BLOOD PRESSURE: 154 MMHG

## 2025-07-15 DIAGNOSIS — M54.16 LUMBAR RADICULOPATHY: ICD-10-CM

## 2025-07-15 DIAGNOSIS — M96.1 LUMBAR POSTLAMINECTOMY SYNDROME: ICD-10-CM

## 2025-07-15 PROCEDURE — 7100000010 HC PHASE TWO TIME - EACH INCREMENTAL 1 MINUTE

## 2025-07-15 PROCEDURE — 7100000009 HC PHASE TWO TIME - INITIAL BASE CHARGE

## 2025-07-15 PROCEDURE — 2550000001 HC RX 255 CONTRASTS: Mod: JW | Performed by: ANESTHESIOLOGY

## 2025-07-15 PROCEDURE — 64483 NJX AA&/STRD TFRM EPI L/S 1: CPT | Performed by: ANESTHESIOLOGY

## 2025-07-15 PROCEDURE — 2500000004 HC RX 250 GENERAL PHARMACY W/ HCPCS (ALT 636 FOR OP/ED): Mod: JZ | Performed by: ANESTHESIOLOGY

## 2025-07-15 PROCEDURE — 64483 NJX AA&/STRD TFRM EPI L/S 1: CPT | Mod: 50 | Performed by: ANESTHESIOLOGY

## 2025-07-15 RX ORDER — LIDOCAINE HYDROCHLORIDE 5 MG/ML
INJECTION, SOLUTION INFILTRATION; INTRAVENOUS AS NEEDED
Status: COMPLETED | OUTPATIENT
Start: 2025-07-15 | End: 2025-07-15

## 2025-07-15 RX ORDER — METHYLPREDNISOLONE ACETATE 40 MG/ML
INJECTION, SUSPENSION INTRA-ARTICULAR; INTRALESIONAL; INTRAMUSCULAR; SOFT TISSUE AS NEEDED
Status: COMPLETED | OUTPATIENT
Start: 2025-07-15 | End: 2025-07-15

## 2025-07-15 RX ADMIN — METHYLPREDNISOLONE ACETATE 40 MG: 40 INJECTION, SUSPENSION INTRA-ARTICULAR; INTRALESIONAL; INTRAMUSCULAR; INTRASYNOVIAL; SOFT TISSUE at 14:09

## 2025-07-15 RX ADMIN — LIDOCAINE HYDROCHLORIDE 10 ML: 5 INJECTION, SOLUTION INFILTRATION at 14:08

## 2025-07-15 RX ADMIN — IOHEXOL 3 ML: 300 INJECTION, SOLUTION INTRAVENOUS at 14:10

## 2025-07-15 ASSESSMENT — PAIN SCALES - GENERAL
PAINLEVEL_OUTOF10: 0 - NO PAIN
PAINLEVEL_OUTOF10: 4

## 2025-07-15 ASSESSMENT — PAIN - FUNCTIONAL ASSESSMENT: PAIN_FUNCTIONAL_ASSESSMENT: 0-10

## 2025-08-01 ENCOUNTER — APPOINTMENT (OUTPATIENT)
Dept: PHARMACY | Facility: HOSPITAL | Age: 80
End: 2025-08-01
Payer: MEDICARE

## 2025-08-22 ENCOUNTER — APPOINTMENT (OUTPATIENT)
Dept: PHARMACY | Facility: HOSPITAL | Age: 80
End: 2025-08-22
Payer: MEDICARE

## 2025-08-22 DIAGNOSIS — R32 URINARY INCONTINENCE, UNSPECIFIED TYPE: ICD-10-CM

## 2025-08-22 RX ORDER — TROSPIUM CHLORIDE 20 MG/1
20 TABLET, FILM COATED ORAL DAILY
Qty: 30 TABLET | Refills: 1 | Status: SHIPPED | OUTPATIENT
Start: 2025-08-22

## 2025-08-25 DIAGNOSIS — R32 URINARY INCONTINENCE, UNSPECIFIED TYPE: ICD-10-CM

## 2025-08-25 RX ORDER — TROSPIUM CHLORIDE 20 MG/1
20 TABLET, FILM COATED ORAL DAILY
Qty: 90 TABLET | Refills: 1 | Status: SHIPPED | OUTPATIENT
Start: 2025-08-25 | End: 2026-02-21

## 2025-08-26 ENCOUNTER — APPOINTMENT (OUTPATIENT)
Dept: PAIN MEDICINE | Facility: CLINIC | Age: 80
End: 2025-08-26
Payer: MEDICARE

## 2025-08-26 VITALS
RESPIRATION RATE: 18 BRPM | WEIGHT: 194 LBS | TEMPERATURE: 98.8 F | BODY MASS INDEX: 29.5 KG/M2 | SYSTOLIC BLOOD PRESSURE: 119 MMHG | OXYGEN SATURATION: 95 % | HEART RATE: 72 BPM | DIASTOLIC BLOOD PRESSURE: 65 MMHG

## 2025-08-26 DIAGNOSIS — Z79.891 LONG TERM CURRENT USE OF OPIATE ANALGESIC: Primary | ICD-10-CM

## 2025-08-26 DIAGNOSIS — M96.1 LUMBAR POSTLAMINECTOMY SYNDROME: ICD-10-CM

## 2025-08-26 DIAGNOSIS — M54.16 LUMBAR RADICULOPATHY: ICD-10-CM

## 2025-08-26 DIAGNOSIS — G89.4 CHRONIC PAIN SYNDROME: ICD-10-CM

## 2025-08-26 DIAGNOSIS — M96.1 POSTLAMINECTOMY SYNDROME OF LUMBAR REGION: ICD-10-CM

## 2025-08-26 PROCEDURE — 1160F RVW MEDS BY RX/DR IN RCRD: CPT | Performed by: NURSE PRACTITIONER

## 2025-08-26 PROCEDURE — 99214 OFFICE O/P EST MOD 30 MIN: CPT | Performed by: NURSE PRACTITIONER

## 2025-08-26 PROCEDURE — 99212 OFFICE O/P EST SF 10 MIN: CPT

## 2025-08-26 PROCEDURE — 3078F DIAST BP <80 MM HG: CPT | Performed by: NURSE PRACTITIONER

## 2025-08-26 PROCEDURE — 3074F SYST BP LT 130 MM HG: CPT | Performed by: NURSE PRACTITIONER

## 2025-08-26 PROCEDURE — 1125F AMNT PAIN NOTED PAIN PRSNT: CPT | Performed by: NURSE PRACTITIONER

## 2025-08-26 PROCEDURE — 1159F MED LIST DOCD IN RCRD: CPT | Performed by: NURSE PRACTITIONER

## 2025-08-26 RX ORDER — ACETAMINOPHEN 100 %
POWDER (GRAM) MISCELLANEOUS
Qty: 40 G | Refills: 11 | Status: SHIPPED | OUTPATIENT
Start: 2025-08-26

## 2025-08-26 RX ORDER — TRAMADOL HYDROCHLORIDE 50 MG/1
50 TABLET, FILM COATED ORAL EVERY 6 HOURS PRN
Qty: 120 TABLET | Refills: 0 | Status: SHIPPED | OUTPATIENT
Start: 2025-08-26

## 2025-08-26 ASSESSMENT — PAIN SCALES - GENERAL
PAINLEVEL_OUTOF10: 6
PAINLEVEL_OUTOF10: 6

## 2025-08-26 ASSESSMENT — ENCOUNTER SYMPTOMS
FREQUENCY: 0
FATIGUE: 0
SHORTNESS OF BREATH: 0
CONSTIPATION: 0
BACK PAIN: 1
MYALGIAS: 1
WHEEZING: 0
DEPRESSION: 0
NAUSEA: 0
PALPITATIONS: 0
DIZZINESS: 0
LOSS OF SENSATION IN FEET: 1
DIARRHEA: 0
CHEST TIGHTNESS: 0
CONFUSION: 0
ARTHRALGIAS: 1
CHILLS: 0
AGITATION: 0
OCCASIONAL FEELINGS OF UNSTEADINESS: 0
HEADACHES: 0

## 2025-08-26 ASSESSMENT — PAIN DESCRIPTION - DESCRIPTORS: DESCRIPTORS: SHARP;ACHING

## 2025-08-26 ASSESSMENT — PAIN - FUNCTIONAL ASSESSMENT: PAIN_FUNCTIONAL_ASSESSMENT: 0-10

## 2025-08-27 LAB
AMPHETAMINES UR QL: NEGATIVE NG/ML
BARBITURATES UR QL: NEGATIVE NG/ML
BENZODIAZ UR QL: NEGATIVE NG/ML
BZE UR QL: NEGATIVE NG/ML
CREAT UR-MCNC: 85.4 MG/DL
FENTANYL UR QL SCN: NEGATIVE NG/ML
METHADONE UR QL: NEGATIVE NG/ML
OPIATES UR QL: NEGATIVE NG/ML
OXIDANTS UR QL: NEGATIVE MCG/ML
OXYCODONE UR QL: NEGATIVE NG/ML
PCP UR QL: NEGATIVE NG/ML
PH UR: 5.7 [PH] (ref 4.5–9)
QUEST NOTES AND COMMENTS: NORMAL
THC UR QL: NEGATIVE NG/ML

## 2025-09-26 ENCOUNTER — APPOINTMENT (OUTPATIENT)
Dept: PHARMACY | Facility: HOSPITAL | Age: 80
End: 2025-09-26
Payer: MEDICARE

## 2025-10-22 ENCOUNTER — APPOINTMENT (OUTPATIENT)
Dept: PRIMARY CARE | Facility: CLINIC | Age: 80
End: 2025-10-22
Payer: MEDICARE

## 2025-10-29 ENCOUNTER — APPOINTMENT (OUTPATIENT)
Dept: PRIMARY CARE | Facility: CLINIC | Age: 80
End: 2025-10-29
Payer: MEDICARE